# Patient Record
Sex: FEMALE | Race: WHITE | NOT HISPANIC OR LATINO | Employment: UNEMPLOYED | URBAN - METROPOLITAN AREA
[De-identification: names, ages, dates, MRNs, and addresses within clinical notes are randomized per-mention and may not be internally consistent; named-entity substitution may affect disease eponyms.]

---

## 2017-11-08 ENCOUNTER — TELEPHONE (OUTPATIENT)
Dept: NEUROSURGERY | Facility: CLINIC | Age: 57
End: 2017-11-08

## 2017-11-08 DIAGNOSIS — I65.29 STENOSIS OF CAROTID ARTERY, UNSPECIFIED LATERALITY: Primary | ICD-10-CM

## 2017-11-09 NOTE — TELEPHONE ENCOUNTER
I spoke with Paige this morning.  She says that pt will need to have repeated u/s wherever she had it done in the past.  This will assure we have the same quality study to compare to.

## 2017-11-09 NOTE — TELEPHONE ENCOUNTER
I usually schedule my ultrasounds at the hospital, so does anybody know if Dr. riggins does his different and has it at Sushil's office? DC

## 2017-12-13 ENCOUNTER — OFFICE VISIT (OUTPATIENT)
Dept: NEUROSURGERY | Facility: CLINIC | Age: 57
End: 2017-12-13

## 2017-12-13 ENCOUNTER — HOSPITAL ENCOUNTER (OUTPATIENT)
Dept: CARDIOLOGY | Facility: HOSPITAL | Age: 57
Discharge: HOME | End: 2017-12-13
Admitting: PHYSICIAN ASSISTANT

## 2017-12-13 VITALS
DIASTOLIC BLOOD PRESSURE: 92 MMHG | BODY MASS INDEX: 31.98 KG/M2 | SYSTOLIC BLOOD PRESSURE: 152 MMHG | HEIGHT: 62 IN | TEMPERATURE: 99.8 F | WEIGHT: 173.8 LBS

## 2017-12-13 DIAGNOSIS — I65.29 STENOSIS OF CAROTID ARTERY, UNSPECIFIED LATERALITY: ICD-10-CM

## 2017-12-13 DIAGNOSIS — I65.23 BILATERAL CAROTID ARTERY STENOSIS: Primary | ICD-10-CM

## 2017-12-13 LAB
BH CV XLRA MEAS LEFT CCA RATIO VEL: 86.8 CM/SEC
BH CV XLRA MEAS LEFT DIST CCA EDV: 29.5 CM/SEC
BH CV XLRA MEAS LEFT DIST CCA PSV: 95.2 CM/SEC
BH CV XLRA MEAS LEFT DIST ICA EDV: 28.4 CM/SEC
BH CV XLRA MEAS LEFT DIST ICA PSV: 66.4 CM/SEC
BH CV XLRA MEAS LEFT ICA RATIO VEL: 148 CM/SEC
BH CV XLRA MEAS LEFT ICA/CCA RATIO: 1.7
BH CV XLRA MEAS LEFT MID CCA EDV: 26.1 CM/SEC
BH CV XLRA MEAS LEFT MID CCA PSV: 86.8 CM/SEC
BH CV XLRA MEAS LEFT MID ICA EDV: 32.9 CM/SEC
BH CV XLRA MEAS LEFT MID ICA PSV: 125 CM/SEC
BH CV XLRA MEAS LEFT PROX CCA EDV: 36 CM/SEC
BH CV XLRA MEAS LEFT PROX CCA PSV: 119 CM/SEC
BH CV XLRA MEAS LEFT PROX ECA PSV: 107 CM/SEC
BH CV XLRA MEAS LEFT PROX ICA EDV: 48 CM/SEC
BH CV XLRA MEAS LEFT PROX ICA PSV: 136 CM/SEC
BH CV XLRA MEAS LEFT PROX SCLA PSV: 144 CM/SEC
BH CV XLRA MEAS LEFT VERTEBRAL A PSV: 68.8 CM/SEC
BH CV XLRA MEAS RIGHT CCA RATIO VEL: 95.5 CM/SEC
BH CV XLRA MEAS RIGHT DIST CCA EDV: 25.8 CM/SEC
BH CV XLRA MEAS RIGHT DIST CCA PSV: 89.3 CM/SEC
BH CV XLRA MEAS RIGHT DIST ICA EDV: 27.1 CM/SEC
BH CV XLRA MEAS RIGHT DIST ICA PSV: 61.6 CM/SEC
BH CV XLRA MEAS RIGHT ICA RATIO VEL: 152 CM/SEC
BH CV XLRA MEAS RIGHT ICA/CCA RATIO: 1.6
BH CV XLRA MEAS RIGHT MID CCA EDV: 25.8 CM/SEC
BH CV XLRA MEAS RIGHT MID CCA PSV: 95.5 CM/SEC
BH CV XLRA MEAS RIGHT MID ICA EDV: 57.4 CM/SEC
BH CV XLRA MEAS RIGHT MID ICA PSV: 152 CM/SEC
BH CV XLRA MEAS RIGHT PROX CCA EDV: 24.5 CM/SEC
BH CV XLRA MEAS RIGHT PROX CCA PSV: 113 CM/SEC
BH CV XLRA MEAS RIGHT PROX ECA PSV: 91.7 CM/SEC
BH CV XLRA MEAS RIGHT PROX ICA EDV: 42.1 CM/SEC
BH CV XLRA MEAS RIGHT PROX ICA PSV: 110 CM/SEC
BH CV XLRA MEAS RIGHT PROX SCLA PSV: 204 CM/SEC
BH CV XLRA MEAS RIGHT VERTEBRAL A PSV: 35.4 CM/SEC

## 2017-12-13 PROCEDURE — 99214 OFFICE O/P EST MOD 30 MIN: CPT | Performed by: NURSE PRACTITIONER

## 2017-12-13 PROCEDURE — 93880 EXTRACRANIAL BILAT STUDY: CPT | Performed by: INTERNAL MEDICINE

## 2017-12-13 PROCEDURE — 93880 EXTRACRANIAL BILAT STUDY: CPT

## 2017-12-13 RX ORDER — ATORVASTATIN CALCIUM 40 MG/1
40 TABLET, FILM COATED ORAL EVERY MORNING
COMMUNITY
Start: 2017-10-30 | End: 2022-08-26 | Stop reason: HOSPADM

## 2017-12-13 RX ORDER — NICOTINE POLACRILEX 4 MG/1
GUM, CHEWING ORAL
COMMUNITY
Start: 2017-11-30 | End: 2022-08-23

## 2017-12-13 RX ORDER — CANAGLIFLOZIN 100 MG/1
TABLET, FILM COATED ORAL
COMMUNITY
Start: 2017-11-30 | End: 2022-08-23

## 2017-12-13 RX ORDER — OXYBUTYNIN CHLORIDE 5 MG/1
5 TABLET ORAL 2 TIMES DAILY
COMMUNITY
Start: 2017-10-30

## 2017-12-13 RX ORDER — FLUOXETINE HYDROCHLORIDE 40 MG/1
40 CAPSULE ORAL DAILY
COMMUNITY
Start: 2017-11-30

## 2017-12-13 RX ORDER — AMLODIPINE BESYLATE 10 MG/1
10 TABLET ORAL DAILY
COMMUNITY
Start: 2017-10-30

## 2017-12-13 NOTE — PROGRESS NOTES
"NAME: GRISELDA MCGUIRE   DOS: 2017  : 1960  PCP: JONA Mendoza    Chief Complaint:    Chief Complaint   Patient presents with   • Carotid Artery Disease     f/u yearly       History of Present Illness:  57 y.o. female well known to the neurointerventional service who was last seen in 2015 after suffering from a lacunar stroke which occurred in 2014.  She has been followed for a atherosclerotic plaque formation at there right carotid bifurcation, however this was \"non-flow limiting\" in nature and without hemodynamically significant stenosis and she was treated with medical therapy.  She presents today for follow-up.  She has no new evidence of stroke such as unilateral weakness or numbness, loss of speech fluency or vision changes.      Past Medical History:  Past Medical History:   Diagnosis Date   • Diabetes mellitus    • Hypertension    • Stroke        Past Surgical History:  History reviewed. No pertinent surgical history.    Review of Systems:        Review of Systems   Constitutional: Negative for activity change, appetite change, chills, diaphoresis, fatigue, fever and unexpected weight change.   HENT: Negative for congestion, dental problem, drooling, ear discharge, ear pain, facial swelling, hearing loss, mouth sores, nosebleeds, postnasal drip, rhinorrhea, sinus pressure, sneezing, sore throat, tinnitus, trouble swallowing and voice change.    Eyes: Negative for photophobia, pain, discharge, redness, itching and visual disturbance.   Respiratory: Negative for apnea, cough, choking, chest tightness, shortness of breath, wheezing and stridor.    Cardiovascular: Negative for chest pain, palpitations and leg swelling.   Gastrointestinal: Negative for abdominal distention, abdominal pain, anal bleeding, blood in stool, constipation, diarrhea, nausea, rectal pain and vomiting.   Endocrine: Negative for cold intolerance, heat intolerance, polydipsia, polyphagia and " polyuria.   Genitourinary: Negative for decreased urine volume, difficulty urinating, dysuria, enuresis, flank pain, frequency, genital sores, hematuria and urgency.   Musculoskeletal: Negative for arthralgias, back pain, gait problem, joint swelling, myalgias, neck pain and neck stiffness.   Skin: Negative for color change, pallor, rash and wound.   Allergic/Immunologic: Negative for environmental allergies, food allergies and immunocompromised state.   Neurological: Negative for dizziness, tremors, seizures, syncope, facial asymmetry, speech difficulty, weakness, light-headedness, numbness and headaches.   Hematological: Negative for adenopathy. Does not bruise/bleed easily.   Psychiatric/Behavioral: Negative for agitation, behavioral problems, confusion, decreased concentration, dysphoric mood, hallucinations, self-injury, sleep disturbance and suicidal ideas. The patient is not nervous/anxious and is not hyperactive.         Medications    Current Outpatient Prescriptions:   •  amLODIPine (NORVASC) 10 MG tablet, , Disp: , Rfl:   •  atorvastatin (LIPITOR) 40 MG tablet, , Disp: , Rfl:   •  FLUoxetine (PROzac) 40 MG capsule, , Disp: , Rfl:   •  INVOKANA 100 MG tablet, , Disp: , Rfl:   •  metoprolol tartrate (LOPRESSOR) 25 MG tablet, , Disp: , Rfl:   •  Omeprazole 20 MG tablet delayed-release, , Disp: , Rfl:   •  oxybutynin (DITROPAN) 5 MG tablet, , Disp: , Rfl:     Allergies:  No Known Allergies    Social Hx:  Social History   Substance Use Topics   • Smoking status: Never Smoker   • Smokeless tobacco: Never Used   • Alcohol use Yes      Comment: rarely       Family Hx:  Family History   Problem Relation Age of Onset   • No Known Problems Mother    • Heart attack Father        Review of Imaging:  Bilateral Carotid Duplex:     Prox CCA .0 cm/sec     Prox CCA .0 cm/sec     Prox CCA EDV 36.0 cm/sec     Prox CCA EDV 24.5 cm/sec     left Mid CCA PSV 86.8 cm/sec     Right Mid CCA PSV 95.5 cm/sec     left  Mid CCA EDV 26.1 cm/sec     right Mid CCA EDV 25.8 cm/sec     Dist CCA PSV 95.2 cm/sec     Dist CCA PSV 89.3 cm/sec     Dist CCA EDV 29.5 cm/sec     Dist CCA EDV 25.8 cm/sec     CCA ratio mirtha 86.8 cm/sec     CCA ratio mirtha 95.5 cm/sec     Prox ECA .0 cm/sec     Prox ECA PSV 91.7 cm/sec     Prox ICA .0 cm/sec     Prox ICA .0 cm/sec     Prox ICA EDV 48.0 cm/sec     Prox ICA EDV 42.1 cm/sec     Mid ICA .0 cm/sec     Mid ICA .0 cm/sec     Mid ICA EDV 32.9 cm/sec     Mid ICA EDV 57.4 cm/sec     Dist ICA PSV 66.4 cm/sec     Dist ICA PSV 61.6 cm/sec     Dist ICA EDV 28.4 cm/sec     Dist ICA EDV 27.1 cm/sec     ICA ratio mirtha 148.0 cm/sec     ICA ratio mirtha 152.0 cm/sec     Vertebral A PSV 68.8 cm/sec     Vertebral A PSV 35.4 cm/sec     ICA/CCA ratio 1.7    ICA/CCA ratio 1.6    Prox SCLA .0 cm/sec     Prox SCLA .0 cm/sec    Narrative:       · Right internal carotid artery stenosis of 50-69%.  · Left internal carotid artery stenosis of 50-69%.        I have personally reviewed this.    Physical Examination:  Vitals:    12/13/17 1051   BP: 152/92   Temp: 99.8 °F (37.7 °C)        General Appearance:   Well developed, well nourished, well groomed, alert, and cooperative.  Cardiovascular: Regular rate and rhythm. No carotid bruits      Neurological examination:  Neurologic Exam     Mental Status   Oriented to person, place, and time.   Speech: speech is normal   Level of consciousness: alert  Knowledge: good.     Cranial Nerves   Cranial nerves II through XII intact.     Motor Exam   Muscle bulk: normal    Sensory Exam   Light touch normal.     Gait, Coordination, and Reflexes     Gait  Gait: normal    Coordination   Romberg: negative  Finger to nose coordination: normal      Diagnoses/Plan:    Ms. Garnica is a 57 y.o. female who suffered a lacunar stroke in September of 2014.  She was found to have a non-flow limiting stenosis of her right carotid bifurcation and this has been  medically managed.  She has done well since then with no new signs and symptoms of a stroke.  Her carotid duplex shows stable plaque on the right but slight increase on the left.  We have discussed increasing her ASA to 325mg and continuing her atorvastatin.  We discussed having her HgA1C drawn by her PCP and continuing to monitor for optimal management.  We discussed what signs and symptoms would warrant her returning to the ED for stroke such as slurred speech, facial droop and appendage weakness and she verbalizes understanding.  She will see us in 1 year for follow up with Carotid Duplex for continued monitoring to exclude any progression of disease that may warrant intervention.

## 2019-08-21 ENCOUNTER — TELEPHONE (OUTPATIENT)
Dept: NEUROSURGERY | Facility: CLINIC | Age: 59
End: 2019-08-21

## 2019-08-21 DIAGNOSIS — I65.23 BILATERAL CAROTID ARTERY STENOSIS: Primary | ICD-10-CM

## 2019-08-21 NOTE — TELEPHONE ENCOUNTER
Kimmy Perkins to [facility] Clinical Pool   17 minutes ago  Can someone place a carotid duplex for this patients follow up? Thanks

## 2019-09-18 ENCOUNTER — HOSPITAL ENCOUNTER (OUTPATIENT)
Dept: CARDIOLOGY | Facility: HOSPITAL | Age: 59
Discharge: HOME | End: 2019-09-18
Admitting: PHYSICIAN ASSISTANT

## 2019-09-18 ENCOUNTER — OFFICE VISIT (OUTPATIENT)
Dept: NEUROSURGERY | Facility: CLINIC | Age: 59
End: 2019-09-18

## 2019-09-18 VITALS
DIASTOLIC BLOOD PRESSURE: 64 MMHG | WEIGHT: 177 LBS | SYSTOLIC BLOOD PRESSURE: 142 MMHG | HEIGHT: 62 IN | BODY MASS INDEX: 32.57 KG/M2 | TEMPERATURE: 98.2 F

## 2019-09-18 DIAGNOSIS — I65.22 CAROTID STENOSIS, ASYMPTOMATIC, LEFT: Primary | ICD-10-CM

## 2019-09-18 DIAGNOSIS — I65.23 BILATERAL CAROTID ARTERY STENOSIS: ICD-10-CM

## 2019-09-18 LAB
BH CV XLRA MEAS LEFT BULB EDV: 25.1 CM/SEC
BH CV XLRA MEAS LEFT BULB PSV: 102 CM/SEC
BH CV XLRA MEAS LEFT CCA RATIO VEL: 99 CM/SEC
BH CV XLRA MEAS LEFT DIST CCA EDV: 23.6 CM/SEC
BH CV XLRA MEAS LEFT DIST CCA PSV: 85.6 CM/SEC
BH CV XLRA MEAS LEFT DIST ICA EDV: 24 CM/SEC
BH CV XLRA MEAS LEFT DIST ICA PSV: 63 CM/SEC
BH CV XLRA MEAS LEFT ICA RATIO VEL: 117 CM/SEC
BH CV XLRA MEAS LEFT ICA/CCA RATIO: 1.2
BH CV XLRA MEAS LEFT MID CCA EDV: 21.2 CM/SEC
BH CV XLRA MEAS LEFT MID CCA PSV: 99.8 CM/SEC
BH CV XLRA MEAS LEFT MID ICA EDV: 41 CM/SEC
BH CV XLRA MEAS LEFT MID ICA PSV: 138 CM/SEC
BH CV XLRA MEAS LEFT PROX CCA EDV: 22 CM/SEC
BH CV XLRA MEAS LEFT PROX CCA PSV: 113.1 CM/SEC
BH CV XLRA MEAS LEFT PROX ECA EDV: 13 CM/SEC
BH CV XLRA MEAS LEFT PROX ECA PSV: 138 CM/SEC
BH CV XLRA MEAS LEFT PROX ICA EDV: 41 CM/SEC
BH CV XLRA MEAS LEFT PROX ICA PSV: 181 CM/SEC
BH CV XLRA MEAS LEFT PROX SCLA PSV: 168 CM/SEC
BH CV XLRA MEAS LEFT VERTEBRAL A EDV: 14 CM/SEC
BH CV XLRA MEAS LEFT VERTEBRAL A PSV: 59 CM/SEC
BH CV XLRA MEAS RIGHT CCA RATIO VEL: 102 CM/SEC
BH CV XLRA MEAS RIGHT DIST CCA EDV: 22 CM/SEC
BH CV XLRA MEAS RIGHT DIST CCA PSV: 80 CM/SEC
BH CV XLRA MEAS RIGHT DIST ICA EDV: 20 CM/SEC
BH CV XLRA MEAS RIGHT DIST ICA PSV: 77 CM/SEC
BH CV XLRA MEAS RIGHT ICA RATIO VEL: 128 CM/SEC
BH CV XLRA MEAS RIGHT ICA/CCA RATIO: 1.3
BH CV XLRA MEAS RIGHT MID CCA EDV: 20 CM/SEC
BH CV XLRA MEAS RIGHT MID CCA PSV: 103 CM/SEC
BH CV XLRA MEAS RIGHT MID ICA EDV: 40 CM/SEC
BH CV XLRA MEAS RIGHT MID ICA PSV: 116 CM/SEC
BH CV XLRA MEAS RIGHT PROX CCA EDV: 15 CM/SEC
BH CV XLRA MEAS RIGHT PROX CCA PSV: 96 CM/SEC
BH CV XLRA MEAS RIGHT PROX ECA EDV: 16 CM/SEC
BH CV XLRA MEAS RIGHT PROX ECA PSV: 113 CM/SEC
BH CV XLRA MEAS RIGHT PROX ICA EDV: 35 CM/SEC
BH CV XLRA MEAS RIGHT PROX ICA PSV: 128 CM/SEC
BH CV XLRA MEAS RIGHT PROX SCLA PSV: 121 CM/SEC
BH CV XLRA MEAS RIGHT VERTEBRAL A EDV: 18 CM/SEC
BH CV XLRA MEAS RIGHT VERTEBRAL A PSV: 62 CM/SEC
LEFT ARM BP: NORMAL MMHG
RIGHT ARM BP: NORMAL MMHG

## 2019-09-18 PROCEDURE — 93880 EXTRACRANIAL BILAT STUDY: CPT | Performed by: INTERNAL MEDICINE

## 2019-09-18 PROCEDURE — 93880 EXTRACRANIAL BILAT STUDY: CPT

## 2019-09-18 PROCEDURE — 99213 OFFICE O/P EST LOW 20 MIN: CPT | Performed by: RADIOLOGY

## 2019-09-18 RX ORDER — ASPIRIN 325 MG
325 TABLET, DELAYED RELEASE (ENTERIC COATED) ORAL NIGHTLY
COMMUNITY
End: 2022-08-26 | Stop reason: HOSPADM

## 2019-09-18 RX ORDER — LISINOPRIL 10 MG/1
10 TABLET ORAL DAILY
Status: ON HOLD | COMMUNITY
End: 2022-08-24

## 2019-09-18 NOTE — PROGRESS NOTES
"NAME: GRISELDA MCGUIRE   DOS: 2019  : 1960  PCP: Andie Chambers APRN    Chief Complaint:    Chief Complaint   Patient presents with   • Carotid Artery Disease       History of Present Illness:  59 y.o. female known to the interventional service, being followed for asymptomatic carotid disease.  She was initially seen in 2015 after suffering a \"lacunar\" stroke, likely related to poorly controlled diabetes.  She was found to have plaque formation at the bilateral carotid bifurcations, but this was \"non-flow-limiting\" in nature and of no hemodynamic significance, and thus is being managed conservatively.  She has done very well since I saw her last, denying any stroke or TIA-like symptoms.  She presents today for routine follow-up.      Past Medical History:  Past Medical History:   Diagnosis Date   • Diabetes mellitus (CMS/HCC)    • Hypertension    • Stroke (CMS/HCC)        Past Surgical History:  No past surgical history on file.    Review of Systems:        Review of Systems   Eyes: Positive for visual disturbance.   All other systems reviewed and are negative.       Medications    Current Outpatient Medications:   •  aspirin  MG tablet, Take 325 mg by mouth Every Night., Disp: , Rfl:   •  linagliptin (TRADJENTA) 5 MG tablet tablet, Take 5 mg by mouth Daily., Disp: , Rfl:   •  lisinopril (PRINIVIL,ZESTRIL) 10 MG tablet, Take 10 mg by mouth Daily., Disp: , Rfl:   •  amLODIPine (NORVASC) 10 MG tablet, , Disp: , Rfl:   •  atorvastatin (LIPITOR) 40 MG tablet, , Disp: , Rfl:   •  FLUoxetine (PROzac) 40 MG capsule, , Disp: , Rfl:   •  INVOKANA 100 MG tablet, , Disp: , Rfl:   •  metoprolol tartrate (LOPRESSOR) 25 MG tablet, , Disp: , Rfl:   •  Omeprazole 20 MG tablet delayed-release, , Disp: , Rfl:   •  oxybutynin (DITROPAN) 5 MG tablet, , Disp: , Rfl:     Allergies:  No Known Allergies    Social Hx:  Social History     Tobacco Use   • Smoking status: Never Smoker   • Smokeless tobacco: " "Never Used   Substance Use Topics   • Alcohol use: Yes     Comment: rarely   • Drug use: No       Family Hx:  Family History   Problem Relation Age of Onset   • No Known Problems Mother    • Heart attack Father        Review of Imaging:  Carotid duplex from [facility] dated 9/18/2019 was reviewed along with its corresponding radiologic report.  Comparison is made to prior study dated 12/13/2017.  Given differences in technique, there is stable mild-moderate plaque formation at the bilateral carotid bifurcations, but without hemodynamically significant disease.  Peak velocities within the right carotid vasculature are 128/35 cm/s, with and ICA/CCA ratio of 1.3 (was 152/57 cm/s, ratio 1.6).  Peak velocities within the left carotid vasculature are 164/38 cm/s, ratio 1.2 (was 136/48 cm/s, ratio 1.7).    Physical Examination:  Vitals:    09/18/19 1512   BP: 142/64   Temp: 98.2 °F (36.8 °C)        General Appearance:   Well developed, well nourished, well groomed, alert, and cooperative.  Cardiovascular: Regular rate and rhythm. No carotid bruits      Neurological examination:  Neurologic Exam     Mental Status   Oriented to person, place, and time.   Speech: speech is normal   Level of consciousness: alert    Cranial Nerves     CN II   Visual acuity: decreased (Decreased visual acuity in the left eye, currently receiving \"injections\".)    Motor Exam     Strength   Strength 5/5 throughout.     Sensory Exam   Light touch normal.     Gait, Coordination, and Reflexes     Gait  Gait: normal      Diagnoses/Plan:    Ms. Garnica is a 59 y.o. female known to the interventional service, being followed for asymptomatic carotid artery disease found during a \"lacunar\" stroke in September 2014 (likely related to poorly controlled diabetes).  Carotid duplex today demonstrates stable plaque formation at the carotid bifurcations, without hemodynamically significant lesion/stenosis.  Given the asymptomatic nature, will " continue conservative management with aspirin/statin therapy.  I plan on seeing her back in 12 months time with carotid duplex, to ensure stability and exclude any progression of disease that might necessitate further treatment/intervention.  She will contact our office and/or call 911 during the interim if she develops any stroke or TIA-like symptoms.

## 2022-08-23 ENCOUNTER — APPOINTMENT (OUTPATIENT)
Dept: GENERAL RADIOLOGY | Facility: HOSPITAL | Age: 62
End: 2022-08-23

## 2022-08-23 ENCOUNTER — APPOINTMENT (OUTPATIENT)
Dept: CT IMAGING | Facility: HOSPITAL | Age: 62
End: 2022-08-23

## 2022-08-23 ENCOUNTER — APPOINTMENT (OUTPATIENT)
Dept: MRI IMAGING | Facility: HOSPITAL | Age: 62
End: 2022-08-23

## 2022-08-23 ENCOUNTER — HOSPITAL ENCOUNTER (OUTPATIENT)
Facility: HOSPITAL | Age: 62
Discharge: HOME | End: 2022-08-26
Attending: EMERGENCY MEDICINE | Admitting: RADIOLOGY

## 2022-08-23 DIAGNOSIS — R47.9 SPEECH DISTURBANCE, UNSPECIFIED TYPE: ICD-10-CM

## 2022-08-23 DIAGNOSIS — R42 DIZZINESS: Primary | ICD-10-CM

## 2022-08-23 DIAGNOSIS — U07.1 COVID-19: ICD-10-CM

## 2022-08-23 LAB
ALBUMIN SERPL-MCNC: 4.4 G/DL (ref 3.5–5.2)
ALBUMIN/GLOB SERPL: 1.3 G/DL
ALP SERPL-CCNC: 83 U/L (ref 39–117)
ALT SERPL W P-5'-P-CCNC: 17 U/L (ref 1–33)
ALT SERPL W P-5'-P-CCNC: 18 U/L (ref 1–33)
AMPHET+METHAMPHET UR QL: NEGATIVE
AMPHETAMINES UR QL: NEGATIVE
ANION GAP SERPL CALCULATED.3IONS-SCNC: 17 MMOL/L (ref 5–15)
APAP SERPL-MCNC: <5 MCG/ML (ref 0–30)
APTT PPP: 27.4 SECONDS (ref 22–39)
AST SERPL-CCNC: 25 U/L (ref 1–32)
AST SERPL-CCNC: 26 U/L (ref 1–32)
BARBITURATES UR QL SCN: NEGATIVE
BASOPHILS # BLD AUTO: 0.08 10*3/MM3 (ref 0–0.2)
BASOPHILS NFR BLD AUTO: 0.7 % (ref 0–1.5)
BENZODIAZ UR QL SCN: NEGATIVE
BILIRUB SERPL-MCNC: 0.4 MG/DL (ref 0–1.2)
BILIRUB UR QL STRIP: NEGATIVE
BUN BLDA-MCNC: 27 MG/DL (ref 8–26)
BUN SERPL-MCNC: 24 MG/DL (ref 8–23)
BUN/CREAT SERPL: 17.6 (ref 7–25)
BUPRENORPHINE SERPL-MCNC: NEGATIVE NG/ML
CA-I BLDA-SCNC: 1.25 MMOL/L (ref 1.2–1.32)
CALCIUM SPEC-SCNC: 9.5 MG/DL (ref 8.6–10.5)
CANNABINOIDS SERPL QL: NEGATIVE
CHLORIDE BLDA-SCNC: 101 MMOL/L (ref 98–109)
CHLORIDE SERPL-SCNC: 99 MMOL/L (ref 98–107)
CLARITY UR: CLEAR
CO2 BLDA-SCNC: 26 MMOL/L (ref 24–29)
CO2 SERPL-SCNC: 22 MMOL/L (ref 22–29)
COCAINE UR QL: NEGATIVE
COLOR UR: YELLOW
CREAT BLDA-MCNC: 1.4 MG/DL (ref 0.6–1.3)
CREAT SERPL-MCNC: 1.36 MG/DL (ref 0.57–1)
DEPRECATED RDW RBC AUTO: 37.5 FL (ref 37–54)
EGFRCR SERPLBLD CKD-EPI 2021: 42.1 ML/MIN/1.73
EGFRCR SERPLBLD CKD-EPI 2021: 43.6 ML/MIN/1.73
EOSINOPHIL # BLD AUTO: 1.54 10*3/MM3 (ref 0–0.4)
EOSINOPHIL NFR BLD AUTO: 14.2 % (ref 0.3–6.2)
ERYTHROCYTE [DISTWIDTH] IN BLOOD BY AUTOMATED COUNT: 12.2 % (ref 12.3–15.4)
ETHANOL BLD-MCNC: <10 MG/DL (ref 0–10)
FLUAV RNA RESP QL NAA+PROBE: NOT DETECTED
FLUBV RNA RESP QL NAA+PROBE: NOT DETECTED
GLOBULIN UR ELPH-MCNC: 3.5 GM/DL
GLUCOSE BLDC GLUCOMTR-MCNC: 98 MG/DL (ref 70–130)
GLUCOSE SERPL-MCNC: 105 MG/DL (ref 65–99)
GLUCOSE UR STRIP-MCNC: NEGATIVE MG/DL
HCT VFR BLD AUTO: 38.7 % (ref 34–46.6)
HCT VFR BLDA CALC: 40 % (ref 38–51)
HGB BLD-MCNC: 13 G/DL (ref 12–15.9)
HGB BLDA-MCNC: 13.6 G/DL (ref 12–17)
HGB UR QL STRIP.AUTO: NEGATIVE
HOLD SPECIMEN: NORMAL
IMM GRANULOCYTES # BLD AUTO: 0.03 10*3/MM3 (ref 0–0.05)
IMM GRANULOCYTES NFR BLD AUTO: 0.3 % (ref 0–0.5)
INR PPP: 1 (ref 0.8–1.2)
KETONES UR QL STRIP: NEGATIVE
LEUKOCYTE ESTERASE UR QL STRIP.AUTO: NEGATIVE
LYMPHOCYTES # BLD AUTO: 4.56 10*3/MM3 (ref 0.7–3.1)
LYMPHOCYTES NFR BLD AUTO: 42.1 % (ref 19.6–45.3)
MCH RBC QN AUTO: 28.6 PG (ref 26.6–33)
MCHC RBC AUTO-ENTMCNC: 33.6 G/DL (ref 31.5–35.7)
MCV RBC AUTO: 85.2 FL (ref 79–97)
METHADONE UR QL SCN: NEGATIVE
MONOCYTES # BLD AUTO: 0.67 10*3/MM3 (ref 0.1–0.9)
MONOCYTES NFR BLD AUTO: 6.2 % (ref 5–12)
NEUTROPHILS NFR BLD AUTO: 3.94 10*3/MM3 (ref 1.7–7)
NEUTROPHILS NFR BLD AUTO: 36.5 % (ref 42.7–76)
NITRITE UR QL STRIP: NEGATIVE
NRBC BLD AUTO-RTO: 0 /100 WBC (ref 0–0.2)
OPIATES UR QL: NEGATIVE
OXYCODONE UR QL SCN: NEGATIVE
PCP UR QL SCN: NEGATIVE
PH UR STRIP.AUTO: 6.5 [PH] (ref 5–8)
PLATELET # BLD AUTO: 273 10*3/MM3 (ref 140–450)
PMV BLD AUTO: 11.3 FL (ref 6–12)
POTASSIUM BLDA-SCNC: 3.6 MMOL/L (ref 3.5–4.9)
POTASSIUM SERPL-SCNC: 3.8 MMOL/L (ref 3.5–5.2)
PROPOXYPH UR QL: NEGATIVE
PROT SERPL-MCNC: 7.9 G/DL (ref 6–8.5)
PROT UR QL STRIP: NEGATIVE
PROTHROMBIN TIME: 11.7 SECONDS (ref 12.8–15.2)
RBC # BLD AUTO: 4.54 10*6/MM3 (ref 3.77–5.28)
SALICYLATES SERPL-MCNC: <0.3 MG/DL
SARS-COV-2 RNA RESP QL NAA+PROBE: DETECTED
SODIUM BLD-SCNC: 142 MMOL/L (ref 138–146)
SODIUM SERPL-SCNC: 138 MMOL/L (ref 136–145)
SP GR UR STRIP: 1.06 (ref 1–1.03)
TRICYCLICS UR QL SCN: NEGATIVE
TROPONIN T SERPL-MCNC: <0.01 NG/ML (ref 0–0.03)
UROBILINOGEN UR QL STRIP: ABNORMAL
WBC NRBC COR # BLD: 10.82 10*3/MM3 (ref 3.4–10.8)
WHOLE BLOOD HOLD COAG: NORMAL
WHOLE BLOOD HOLD SPECIMEN: NORMAL

## 2022-08-23 PROCEDURE — 80053 COMPREHEN METABOLIC PANEL: CPT | Performed by: EMERGENCY MEDICINE

## 2022-08-23 PROCEDURE — 85014 HEMATOCRIT: CPT

## 2022-08-23 PROCEDURE — 99285 EMERGENCY DEPT VISIT HI MDM: CPT

## 2022-08-23 PROCEDURE — 71045 X-RAY EXAM CHEST 1 VIEW: CPT

## 2022-08-23 PROCEDURE — C9803 HOPD COVID-19 SPEC COLLECT: HCPCS

## 2022-08-23 PROCEDURE — 80179 DRUG ASSAY SALICYLATE: CPT | Performed by: EMERGENCY MEDICINE

## 2022-08-23 PROCEDURE — 84484 ASSAY OF TROPONIN QUANT: CPT | Performed by: EMERGENCY MEDICINE

## 2022-08-23 PROCEDURE — 70498 CT ANGIOGRAPHY NECK: CPT

## 2022-08-23 PROCEDURE — 0042T HC CT CEREBRAL PERFUSION W/WO CONTRAST: CPT

## 2022-08-23 PROCEDURE — 70450 CT HEAD/BRAIN W/O DYE: CPT

## 2022-08-23 PROCEDURE — 85025 COMPLETE CBC W/AUTO DIFF WBC: CPT | Performed by: EMERGENCY MEDICINE

## 2022-08-23 PROCEDURE — 80047 BASIC METABLC PNL IONIZED CA: CPT

## 2022-08-23 PROCEDURE — 81003 URINALYSIS AUTO W/O SCOPE: CPT | Performed by: EMERGENCY MEDICINE

## 2022-08-23 PROCEDURE — 87636 SARSCOV2 & INF A&B AMP PRB: CPT | Performed by: FAMILY MEDICINE

## 2022-08-23 PROCEDURE — 99204 OFFICE O/P NEW MOD 45 MIN: CPT | Performed by: STUDENT IN AN ORGANIZED HEALTH CARE EDUCATION/TRAINING PROGRAM

## 2022-08-23 PROCEDURE — 85610 PROTHROMBIN TIME: CPT

## 2022-08-23 PROCEDURE — 80306 DRUG TEST PRSMV INSTRMNT: CPT | Performed by: EMERGENCY MEDICINE

## 2022-08-23 PROCEDURE — 70551 MRI BRAIN STEM W/O DYE: CPT

## 2022-08-23 PROCEDURE — 85379 FIBRIN DEGRADATION QUANT: CPT | Performed by: NURSE PRACTITIONER

## 2022-08-23 PROCEDURE — 93005 ELECTROCARDIOGRAM TRACING: CPT | Performed by: EMERGENCY MEDICINE

## 2022-08-23 PROCEDURE — 82077 ASSAY SPEC XCP UR&BREATH IA: CPT | Performed by: EMERGENCY MEDICINE

## 2022-08-23 PROCEDURE — 0 IOPAMIDOL PER 1 ML: Performed by: EMERGENCY MEDICINE

## 2022-08-23 PROCEDURE — 70496 CT ANGIOGRAPHY HEAD: CPT

## 2022-08-23 PROCEDURE — 80143 DRUG ASSAY ACETAMINOPHEN: CPT | Performed by: EMERGENCY MEDICINE

## 2022-08-23 PROCEDURE — 85730 THROMBOPLASTIN TIME PARTIAL: CPT | Performed by: EMERGENCY MEDICINE

## 2022-08-23 RX ORDER — LOSARTAN POTASSIUM 100 MG/1
1 TABLET ORAL DAILY
COMMUNITY
Start: 2022-03-30

## 2022-08-23 RX ORDER — GLIPIZIDE 5 MG/1
0.5 TABLET ORAL DAILY
COMMUNITY
Start: 2022-04-05

## 2022-08-23 RX ORDER — MECLIZINE HYDROCHLORIDE 25 MG/1
25 TABLET ORAL 3 TIMES DAILY PRN
Status: DISCONTINUED | OUTPATIENT
Start: 2022-08-23 | End: 2022-08-26 | Stop reason: HOSPADM

## 2022-08-23 RX ORDER — ATORVASTATIN CALCIUM 40 MG/1
40 TABLET, FILM COATED ORAL NIGHTLY
Status: CANCELLED | OUTPATIENT
Start: 2022-08-23

## 2022-08-23 RX ORDER — OMEPRAZOLE 20 MG/1
1 CAPSULE, DELAYED RELEASE ORAL DAILY
COMMUNITY
Start: 2022-03-30 | End: 2022-09-14 | Stop reason: HOSPADM

## 2022-08-23 RX ORDER — SODIUM CHLORIDE 0.9 % (FLUSH) 0.9 %
10 SYRINGE (ML) INJECTION AS NEEDED
Status: DISCONTINUED | OUTPATIENT
Start: 2022-08-23 | End: 2022-08-26 | Stop reason: HOSPADM

## 2022-08-23 RX ADMIN — IOPAMIDOL 125 ML: 755 INJECTION, SOLUTION INTRAVENOUS at 19:16

## 2022-08-24 ENCOUNTER — APPOINTMENT (OUTPATIENT)
Dept: CARDIOLOGY | Facility: HOSPITAL | Age: 62
End: 2022-08-24

## 2022-08-24 PROBLEM — F41.8 ANXIETY ASSOCIATED WITH DEPRESSION: Status: ACTIVE | Noted: 2022-08-24

## 2022-08-24 PROBLEM — K21.9 GERD WITHOUT ESOPHAGITIS: Status: ACTIVE | Noted: 2022-08-24

## 2022-08-24 PROBLEM — E11.9 T2DM (TYPE 2 DIABETES MELLITUS) (HCC): Status: ACTIVE | Noted: 2022-08-24

## 2022-08-24 PROBLEM — R47.81 SLURRED SPEECH: Status: ACTIVE | Noted: 2022-08-24

## 2022-08-24 PROBLEM — I10 HTN (HYPERTENSION): Status: ACTIVE | Noted: 2022-08-24

## 2022-08-24 PROBLEM — N17.9 ACUTE KIDNEY INJURY (HCC): Status: ACTIVE | Noted: 2022-08-24

## 2022-08-24 PROBLEM — R42 DIZZINESS: Status: ACTIVE | Noted: 2022-08-24

## 2022-08-24 LAB
ANION GAP SERPL CALCULATED.3IONS-SCNC: 11 MMOL/L (ref 5–15)
ASCENDING AORTA: 2.5 CM
BASOPHILS # BLD AUTO: 0.05 10*3/MM3 (ref 0–0.2)
BASOPHILS NFR BLD AUTO: 0.6 % (ref 0–1.5)
BH CV ECHO MEAS - AO MAX PG: 9.5 MMHG
BH CV ECHO MEAS - AO MEAN PG: 5 MMHG
BH CV ECHO MEAS - AO ROOT DIAM: 2.9 CM
BH CV ECHO MEAS - AO V2 MAX: 154 CM/SEC
BH CV ECHO MEAS - AO V2 VTI: 35.3 CM
BH CV ECHO MEAS - AVA(I,D): 1.94 CM2
BH CV ECHO MEAS - EDV(CUBED): 79.5 ML
BH CV ECHO MEAS - EDV(MOD-SP2): 61.1 ML
BH CV ECHO MEAS - EDV(MOD-SP4): 79.3 ML
BH CV ECHO MEAS - EF(MOD-BP): 64.4 %
BH CV ECHO MEAS - EF(MOD-SP2): 59.1 %
BH CV ECHO MEAS - EF(MOD-SP4): 68.5 %
BH CV ECHO MEAS - ESV(CUBED): 27 ML
BH CV ECHO MEAS - ESV(MOD-SP2): 25 ML
BH CV ECHO MEAS - ESV(MOD-SP4): 25 ML
BH CV ECHO MEAS - FS: 30.2 %
BH CV ECHO MEAS - IVS/LVPW: 1 CM
BH CV ECHO MEAS - IVSD: 1.3 CM
BH CV ECHO MEAS - LA DIMENSION: 3.5 CM
BH CV ECHO MEAS - LAT PEAK E' VEL: 7.1 CM/SEC
BH CV ECHO MEAS - LV MASS(C)D: 207.8 GRAMS
BH CV ECHO MEAS - LV MAX PG: 4.1 MMHG
BH CV ECHO MEAS - LV MEAN PG: 2 MMHG
BH CV ECHO MEAS - LV V1 MAX: 101 CM/SEC
BH CV ECHO MEAS - LV V1 VTI: 21.8 CM
BH CV ECHO MEAS - LVIDD: 4.3 CM
BH CV ECHO MEAS - LVIDS: 3 CM
BH CV ECHO MEAS - LVOT AREA: 3.1 CM2
BH CV ECHO MEAS - LVOT DIAM: 2 CM
BH CV ECHO MEAS - LVPWD: 1.3 CM
BH CV ECHO MEAS - MED PEAK E' VEL: 8.6 CM/SEC
BH CV ECHO MEAS - MV A MAX VEL: 86.5 CM/SEC
BH CV ECHO MEAS - MV DEC SLOPE: 342 CM/SEC2
BH CV ECHO MEAS - MV DEC TIME: 0.25 MSEC
BH CV ECHO MEAS - MV E MAX VEL: 78.1 CM/SEC
BH CV ECHO MEAS - MV E/A: 0.9
BH CV ECHO MEAS - MV P1/2T: 91.6 MSEC
BH CV ECHO MEAS - MVA(P1/2T): 2.4 CM2
BH CV ECHO MEAS - SV(LVOT): 68.5 ML
BH CV ECHO MEAS - SV(MOD-SP2): 36.1 ML
BH CV ECHO MEAS - SV(MOD-SP4): 54.3 ML
BH CV ECHO MEAS - TAPSE (>1.6): 1.8 CM
BH CV ECHO MEASUREMENTS AVERAGE E/E' RATIO: 9.95
BH CV VAS BP RIGHT ARM: NORMAL MMHG
BH CV XLRA - RV BASE: 3.4 CM
BH CV XLRA - RV LENGTH: 6.7 CM
BH CV XLRA - RV MID: 3.1 CM
BH CV XLRA - TDI S': 19.7 CM/SEC
BH CV XLRA MEAS LEFT DIST CCA EDV: 18.8 CM/SEC
BH CV XLRA MEAS LEFT DIST CCA PSV: 84.7 CM/SEC
BH CV XLRA MEAS LEFT DIST ICA EDV: 15.9 CM/SEC
BH CV XLRA MEAS LEFT DIST ICA PSV: 64.2 CM/SEC
BH CV XLRA MEAS LEFT ICA/CCA RATIO: 3.2
BH CV XLRA MEAS LEFT MID CCA EDV: 18.8 CM/SEC
BH CV XLRA MEAS LEFT MID CCA PSV: 91.7 CM/SEC
BH CV XLRA MEAS LEFT MID ICA EDV: 17.4 CM/SEC
BH CV XLRA MEAS LEFT MID ICA PSV: 78.4 CM/SEC
BH CV XLRA MEAS LEFT PROX CCA EDV: 21.2 CM/SEC
BH CV XLRA MEAS LEFT PROX CCA PSV: 98 CM/SEC
BH CV XLRA MEAS LEFT PROX ECA PSV: 162 CM/SEC
BH CV XLRA MEAS LEFT PROX ICA EDV: 65.9 CM/SEC
BH CV XLRA MEAS LEFT PROX ICA PSV: 296 CM/SEC
BH CV XLRA MEAS LEFT PROX SCLA PSV: 136 CM/SEC
BH CV XLRA MEAS LEFT VERTEBRAL A PSV: 66.8 CM/SEC
BH CV XLRA MEAS RIGHT DIST CCA EDV: 21.7 CM/SEC
BH CV XLRA MEAS RIGHT DIST CCA PSV: 90.1 CM/SEC
BH CV XLRA MEAS RIGHT DIST ICA EDV: 23.6 CM/SEC
BH CV XLRA MEAS RIGHT DIST ICA PSV: 90.1 CM/SEC
BH CV XLRA MEAS RIGHT ICA/CCA RATIO: 1.59
BH CV XLRA MEAS RIGHT MID CCA EDV: 18.2 CM/SEC
BH CV XLRA MEAS RIGHT MID CCA PSV: 92.7 CM/SEC
BH CV XLRA MEAS RIGHT MID ICA EDV: 40.8 CM/SEC
BH CV XLRA MEAS RIGHT MID ICA PSV: 147 CM/SEC
BH CV XLRA MEAS RIGHT PROX CCA EDV: 16.5 CM/SEC
BH CV XLRA MEAS RIGHT PROX CCA PSV: 86.6 CM/SEC
BH CV XLRA MEAS RIGHT PROX ECA PSV: 127 CM/SEC
BH CV XLRA MEAS RIGHT PROX ICA EDV: 37.6 CM/SEC
BH CV XLRA MEAS RIGHT PROX ICA PSV: 114 CM/SEC
BH CV XLRA MEAS RIGHT PROX SCLA PSV: 226 CM/SEC
BH CV XLRA MEAS RIGHT VERTEBRAL A PSV: 44.1 CM/SEC
BUN SERPL-MCNC: 21 MG/DL (ref 8–23)
BUN/CREAT SERPL: 19.8 (ref 7–25)
CALCIUM SPEC-SCNC: 9 MG/DL (ref 8.6–10.5)
CHLORIDE SERPL-SCNC: 104 MMOL/L (ref 98–107)
CHOLEST SERPL-MCNC: 191 MG/DL (ref 0–200)
CO2 SERPL-SCNC: 25 MMOL/L (ref 22–29)
CREAT SERPL-MCNC: 1.06 MG/DL (ref 0.57–1)
D DIMER PPP FEU-MCNC: 0.88 MCGFEU/ML (ref 0.01–0.5)
DEPRECATED RDW RBC AUTO: 37.2 FL (ref 37–54)
EGFRCR SERPLBLD CKD-EPI 2021: 58.8 ML/MIN/1.73
EOSINOPHIL # BLD AUTO: 1.11 10*3/MM3 (ref 0–0.4)
EOSINOPHIL NFR BLD AUTO: 12.8 % (ref 0.3–6.2)
ERYTHROCYTE [DISTWIDTH] IN BLOOD BY AUTOMATED COUNT: 12.1 % (ref 12.3–15.4)
GLUCOSE BLDC GLUCOMTR-MCNC: 102 MG/DL (ref 70–130)
GLUCOSE BLDC GLUCOMTR-MCNC: 122 MG/DL (ref 70–130)
GLUCOSE BLDC GLUCOMTR-MCNC: 124 MG/DL (ref 70–130)
GLUCOSE SERPL-MCNC: 112 MG/DL (ref 65–99)
HBA1C MFR BLD: 6.4 % (ref 4.8–5.6)
HCT VFR BLD AUTO: 34.4 % (ref 34–46.6)
HDLC SERPL-MCNC: 38 MG/DL (ref 40–60)
HGB BLD-MCNC: 11.8 G/DL (ref 12–15.9)
IMM GRANULOCYTES # BLD AUTO: 0.02 10*3/MM3 (ref 0–0.05)
IMM GRANULOCYTES NFR BLD AUTO: 0.2 % (ref 0–0.5)
IVRT: 99 MSEC
LDLC SERPL CALC-MCNC: 102 MG/DL (ref 0–100)
LDLC/HDLC SERPL: 2.45 {RATIO}
LEFT ATRIUM VOLUME INDEX: 19.4 ML/M2
LEFT ATRIUM VOLUME: 34.3 ML
LYMPHOCYTES # BLD AUTO: 3.19 10*3/MM3 (ref 0.7–3.1)
LYMPHOCYTES NFR BLD AUTO: 36.8 % (ref 19.6–45.3)
MAGNESIUM SERPL-MCNC: 1.9 MG/DL (ref 1.6–2.4)
MAXIMAL PREDICTED HEART RATE: 156 BPM
MAXIMAL PREDICTED HEART RATE: 156 BPM
MCH RBC QN AUTO: 28.9 PG (ref 26.6–33)
MCHC RBC AUTO-ENTMCNC: 34.3 G/DL (ref 31.5–35.7)
MCV RBC AUTO: 84.1 FL (ref 79–97)
MONOCYTES # BLD AUTO: 0.58 10*3/MM3 (ref 0.1–0.9)
MONOCYTES NFR BLD AUTO: 6.7 % (ref 5–12)
NEUTROPHILS NFR BLD AUTO: 3.72 10*3/MM3 (ref 1.7–7)
NEUTROPHILS NFR BLD AUTO: 42.9 % (ref 42.7–76)
NRBC BLD AUTO-RTO: 0 /100 WBC (ref 0–0.2)
PLATELET # BLD AUTO: 216 10*3/MM3 (ref 140–450)
PMV BLD AUTO: 11.4 FL (ref 6–12)
POTASSIUM SERPL-SCNC: 4 MMOL/L (ref 3.5–5.2)
RBC # BLD AUTO: 4.09 10*6/MM3 (ref 3.77–5.28)
RIGHT ARM BP: NORMAL MMHG
SODIUM SERPL-SCNC: 140 MMOL/L (ref 136–145)
STRESS TARGET HR: 133 BPM
STRESS TARGET HR: 133 BPM
TRIGL SERPL-MCNC: 300 MG/DL (ref 0–150)
TSH SERPL DL<=0.05 MIU/L-ACNC: 3.59 UIU/ML (ref 0.27–4.2)
VLDLC SERPL-MCNC: 51 MG/DL (ref 5–40)
WBC NRBC COR # BLD: 8.67 10*3/MM3 (ref 3.4–10.8)

## 2022-08-24 PROCEDURE — 96361 HYDRATE IV INFUSION ADD-ON: CPT

## 2022-08-24 PROCEDURE — G0378 HOSPITAL OBSERVATION PER HR: HCPCS

## 2022-08-24 PROCEDURE — 83735 ASSAY OF MAGNESIUM: CPT | Performed by: NURSE PRACTITIONER

## 2022-08-24 PROCEDURE — 93306 TTE W/DOPPLER COMPLETE: CPT | Performed by: INTERNAL MEDICINE

## 2022-08-24 PROCEDURE — 93880 EXTRACRANIAL BILAT STUDY: CPT

## 2022-08-24 PROCEDURE — 83036 HEMOGLOBIN GLYCOSYLATED A1C: CPT | Performed by: NURSE PRACTITIONER

## 2022-08-24 PROCEDURE — 99212 OFFICE O/P EST SF 10 MIN: CPT | Performed by: NURSE PRACTITIONER

## 2022-08-24 PROCEDURE — 80061 LIPID PANEL: CPT | Performed by: NURSE PRACTITIONER

## 2022-08-24 PROCEDURE — 93880 EXTRACRANIAL BILAT STUDY: CPT | Performed by: INTERNAL MEDICINE

## 2022-08-24 PROCEDURE — 93306 TTE W/DOPPLER COMPLETE: CPT

## 2022-08-24 PROCEDURE — 85025 COMPLETE CBC W/AUTO DIFF WBC: CPT | Performed by: NURSE PRACTITIONER

## 2022-08-24 PROCEDURE — 84443 ASSAY THYROID STIM HORMONE: CPT | Performed by: NURSE PRACTITIONER

## 2022-08-24 PROCEDURE — 82962 GLUCOSE BLOOD TEST: CPT

## 2022-08-24 PROCEDURE — 96360 HYDRATION IV INFUSION INIT: CPT

## 2022-08-24 PROCEDURE — 80048 BASIC METABOLIC PNL TOTAL CA: CPT | Performed by: NURSE PRACTITIONER

## 2022-08-24 PROCEDURE — 99220 PR INITIAL OBSERVATION CARE/DAY 70 MINUTES: CPT | Performed by: INTERNAL MEDICINE

## 2022-08-24 RX ORDER — OXYBUTYNIN CHLORIDE 5 MG/1
5 TABLET ORAL 2 TIMES DAILY
Status: DISCONTINUED | OUTPATIENT
Start: 2022-08-24 | End: 2022-08-26 | Stop reason: HOSPADM

## 2022-08-24 RX ORDER — SODIUM CHLORIDE 0.9 % (FLUSH) 0.9 %
10 SYRINGE (ML) INJECTION AS NEEDED
Status: DISCONTINUED | OUTPATIENT
Start: 2022-08-24 | End: 2022-08-26 | Stop reason: HOSPADM

## 2022-08-24 RX ORDER — SODIUM CHLORIDE 0.9 % (FLUSH) 0.9 %
10 SYRINGE (ML) INJECTION EVERY 12 HOURS SCHEDULED
Status: DISCONTINUED | OUTPATIENT
Start: 2022-08-24 | End: 2022-08-26 | Stop reason: HOSPADM

## 2022-08-24 RX ORDER — ASPIRIN 325 MG
325 TABLET, DELAYED RELEASE (ENTERIC COATED) ORAL NIGHTLY
Status: DISCONTINUED | OUTPATIENT
Start: 2022-08-24 | End: 2022-08-26 | Stop reason: HOSPADM

## 2022-08-24 RX ORDER — DEXTROMETHORPHAN POLISTIREX 30 MG/5ML
60 SUSPENSION ORAL EVERY 12 HOURS SCHEDULED
Status: DISCONTINUED | OUTPATIENT
Start: 2022-08-24 | End: 2022-08-26 | Stop reason: HOSPADM

## 2022-08-24 RX ORDER — FLUOXETINE HYDROCHLORIDE 20 MG/1
40 CAPSULE ORAL DAILY
Status: DISCONTINUED | OUTPATIENT
Start: 2022-08-24 | End: 2022-08-26 | Stop reason: HOSPADM

## 2022-08-24 RX ORDER — DEXTROSE MONOHYDRATE 25 G/50ML
25 INJECTION, SOLUTION INTRAVENOUS
Status: DISCONTINUED | OUTPATIENT
Start: 2022-08-24 | End: 2022-08-26 | Stop reason: HOSPADM

## 2022-08-24 RX ORDER — PANTOPRAZOLE SODIUM 40 MG/1
40 TABLET, DELAYED RELEASE ORAL DAILY
Status: DISCONTINUED | OUTPATIENT
Start: 2022-08-24 | End: 2022-08-26 | Stop reason: HOSPADM

## 2022-08-24 RX ORDER — AMLODIPINE BESYLATE 10 MG/1
10 TABLET ORAL DAILY
Status: DISCONTINUED | OUTPATIENT
Start: 2022-08-24 | End: 2022-08-26 | Stop reason: HOSPADM

## 2022-08-24 RX ORDER — INSULIN LISPRO 100 [IU]/ML
0-7 INJECTION, SOLUTION INTRAVENOUS; SUBCUTANEOUS
Status: DISCONTINUED | OUTPATIENT
Start: 2022-08-24 | End: 2022-08-26 | Stop reason: HOSPADM

## 2022-08-24 RX ORDER — SODIUM CHLORIDE 9 MG/ML
75 INJECTION, SOLUTION INTRAVENOUS CONTINUOUS
Status: ACTIVE | OUTPATIENT
Start: 2022-08-24 | End: 2022-08-24

## 2022-08-24 RX ORDER — NICOTINE POLACRILEX 4 MG
15 LOZENGE BUCCAL
Status: DISCONTINUED | OUTPATIENT
Start: 2022-08-24 | End: 2022-08-26 | Stop reason: HOSPADM

## 2022-08-24 RX ADMIN — DEXTROMETHORPHAN POLISTIREX 60 MG: 30 SUSPENSION ORAL at 12:20

## 2022-08-24 RX ADMIN — Medication 10 ML: at 21:49

## 2022-08-24 RX ADMIN — Medication 10 ML: at 03:01

## 2022-08-24 RX ADMIN — PANTOPRAZOLE SODIUM 40 MG: 40 TABLET, DELAYED RELEASE ORAL at 08:57

## 2022-08-24 RX ADMIN — OXYBUTYNIN CHLORIDE 5 MG: 5 TABLET ORAL at 08:57

## 2022-08-24 RX ADMIN — SODIUM CHLORIDE 75 ML/HR: 9 INJECTION, SOLUTION INTRAVENOUS at 03:01

## 2022-08-24 RX ADMIN — OXYBUTYNIN CHLORIDE 5 MG: 5 TABLET ORAL at 21:49

## 2022-08-24 RX ADMIN — ASPIRIN 325 MG: 325 TABLET, COATED ORAL at 03:01

## 2022-08-24 RX ADMIN — FLUOXETINE 40 MG: 20 CAPSULE ORAL at 08:57

## 2022-08-24 RX ADMIN — DEXTROMETHORPHAN POLISTIREX 60 MG: 30 SUSPENSION ORAL at 21:49

## 2022-08-24 RX ADMIN — Medication 10 ML: at 08:57

## 2022-08-24 RX ADMIN — ASPIRIN 325 MG: 325 TABLET, COATED ORAL at 21:48

## 2022-08-24 RX ADMIN — AMLODIPINE BESYLATE 10 MG: 10 TABLET ORAL at 08:57

## 2022-08-25 LAB
ALBUMIN SERPL-MCNC: 4.1 G/DL (ref 3.5–5.2)
ALBUMIN/GLOB SERPL: 1.4 G/DL
ALP SERPL-CCNC: 70 U/L (ref 39–117)
ALT SERPL W P-5'-P-CCNC: 16 U/L (ref 1–33)
ANION GAP SERPL CALCULATED.3IONS-SCNC: 13 MMOL/L (ref 5–15)
AST SERPL-CCNC: 25 U/L (ref 1–32)
BASOPHILS # BLD AUTO: 0.05 10*3/MM3 (ref 0–0.2)
BASOPHILS NFR BLD AUTO: 0.8 % (ref 0–1.5)
BILIRUB SERPL-MCNC: 0.4 MG/DL (ref 0–1.2)
BUN SERPL-MCNC: 11 MG/DL (ref 8–23)
BUN/CREAT SERPL: 11.3 (ref 7–25)
CALCIUM SPEC-SCNC: 8.4 MG/DL (ref 8.6–10.5)
CHLORIDE SERPL-SCNC: 104 MMOL/L (ref 98–107)
CO2 SERPL-SCNC: 23 MMOL/L (ref 22–29)
CREAT SERPL-MCNC: 0.97 MG/DL (ref 0.57–1)
DEPRECATED RDW RBC AUTO: 36.6 FL (ref 37–54)
EGFRCR SERPLBLD CKD-EPI 2021: 65.4 ML/MIN/1.73
EOSINOPHIL # BLD AUTO: 0.87 10*3/MM3 (ref 0–0.4)
EOSINOPHIL NFR BLD AUTO: 14.3 % (ref 0.3–6.2)
ERYTHROCYTE [DISTWIDTH] IN BLOOD BY AUTOMATED COUNT: 11.9 % (ref 12.3–15.4)
GLOBULIN UR ELPH-MCNC: 2.9 GM/DL
GLUCOSE BLDC GLUCOMTR-MCNC: 109 MG/DL (ref 70–130)
GLUCOSE BLDC GLUCOMTR-MCNC: 123 MG/DL (ref 70–130)
GLUCOSE BLDC GLUCOMTR-MCNC: 126 MG/DL (ref 70–130)
GLUCOSE BLDC GLUCOMTR-MCNC: 217 MG/DL (ref 70–130)
GLUCOSE SERPL-MCNC: 129 MG/DL (ref 65–99)
HCT VFR BLD AUTO: 35 % (ref 34–46.6)
HGB BLD-MCNC: 11.9 G/DL (ref 12–15.9)
IMM GRANULOCYTES # BLD AUTO: 0.02 10*3/MM3 (ref 0–0.05)
IMM GRANULOCYTES NFR BLD AUTO: 0.3 % (ref 0–0.5)
LYMPHOCYTES # BLD AUTO: 2.4 10*3/MM3 (ref 0.7–3.1)
LYMPHOCYTES NFR BLD AUTO: 39.5 % (ref 19.6–45.3)
MCH RBC QN AUTO: 28.9 PG (ref 26.6–33)
MCHC RBC AUTO-ENTMCNC: 34 G/DL (ref 31.5–35.7)
MCV RBC AUTO: 85 FL (ref 79–97)
MONOCYTES # BLD AUTO: 0.4 10*3/MM3 (ref 0.1–0.9)
MONOCYTES NFR BLD AUTO: 6.6 % (ref 5–12)
NEUTROPHILS NFR BLD AUTO: 2.33 10*3/MM3 (ref 1.7–7)
NEUTROPHILS NFR BLD AUTO: 38.5 % (ref 42.7–76)
NRBC BLD AUTO-RTO: 0 /100 WBC (ref 0–0.2)
PLATELET # BLD AUTO: 215 10*3/MM3 (ref 140–450)
PMV BLD AUTO: 11.3 FL (ref 6–12)
POTASSIUM SERPL-SCNC: 3.4 MMOL/L (ref 3.5–5.2)
POTASSIUM SERPL-SCNC: 4.2 MMOL/L (ref 3.5–5.2)
PROT SERPL-MCNC: 7 G/DL (ref 6–8.5)
RBC # BLD AUTO: 4.12 10*6/MM3 (ref 3.77–5.28)
SODIUM SERPL-SCNC: 140 MMOL/L (ref 136–145)
WBC NRBC COR # BLD: 6.07 10*3/MM3 (ref 3.4–10.8)

## 2022-08-25 PROCEDURE — G0378 HOSPITAL OBSERVATION PER HR: HCPCS

## 2022-08-25 PROCEDURE — 99225 PR SBSQ OBSERVATION CARE/DAY 25 MINUTES: CPT | Performed by: HOSPITALIST

## 2022-08-25 PROCEDURE — 85025 COMPLETE CBC W/AUTO DIFF WBC: CPT | Performed by: INTERNAL MEDICINE

## 2022-08-25 PROCEDURE — 84132 ASSAY OF SERUM POTASSIUM: CPT | Performed by: HOSPITALIST

## 2022-08-25 PROCEDURE — 99214 OFFICE O/P EST MOD 30 MIN: CPT | Performed by: RADIOLOGY

## 2022-08-25 PROCEDURE — 99214 OFFICE O/P EST MOD 30 MIN: CPT | Performed by: NURSE PRACTITIONER

## 2022-08-25 PROCEDURE — 82962 GLUCOSE BLOOD TEST: CPT

## 2022-08-25 PROCEDURE — 80053 COMPREHEN METABOLIC PANEL: CPT | Performed by: INTERNAL MEDICINE

## 2022-08-25 RX ORDER — CLOPIDOGREL BISULFATE 75 MG/1
75 TABLET ORAL DAILY
Status: DISCONTINUED | OUTPATIENT
Start: 2022-08-25 | End: 2022-08-26 | Stop reason: HOSPADM

## 2022-08-25 RX ORDER — POTASSIUM CHLORIDE 750 MG/1
40 CAPSULE, EXTENDED RELEASE ORAL AS NEEDED
Status: DISCONTINUED | OUTPATIENT
Start: 2022-08-25 | End: 2022-08-26 | Stop reason: HOSPADM

## 2022-08-25 RX ORDER — POTASSIUM CHLORIDE 7.45 MG/ML
10 INJECTION INTRAVENOUS
Status: DISCONTINUED | OUTPATIENT
Start: 2022-08-25 | End: 2022-08-26 | Stop reason: HOSPADM

## 2022-08-25 RX ORDER — LOSARTAN POTASSIUM 50 MG/1
100 TABLET ORAL DAILY
Status: DISCONTINUED | OUTPATIENT
Start: 2022-08-25 | End: 2022-08-26 | Stop reason: HOSPADM

## 2022-08-25 RX ORDER — POTASSIUM CHLORIDE 1.5 G/1.77G
40 POWDER, FOR SOLUTION ORAL AS NEEDED
Status: DISCONTINUED | OUTPATIENT
Start: 2022-08-25 | End: 2022-08-26 | Stop reason: HOSPADM

## 2022-08-25 RX ADMIN — ASPIRIN 325 MG: 325 TABLET, COATED ORAL at 21:23

## 2022-08-25 RX ADMIN — FLUOXETINE 40 MG: 20 CAPSULE ORAL at 08:46

## 2022-08-25 RX ADMIN — POTASSIUM CHLORIDE 40 MEQ: 750 CAPSULE, EXTENDED RELEASE ORAL at 14:10

## 2022-08-25 RX ADMIN — OXYBUTYNIN CHLORIDE 5 MG: 5 TABLET ORAL at 21:23

## 2022-08-25 RX ADMIN — Medication 10 ML: at 21:23

## 2022-08-25 RX ADMIN — CLOPIDOGREL BISULFATE 75 MG: 75 TABLET ORAL at 12:48

## 2022-08-25 RX ADMIN — DEXTROMETHORPHAN POLISTIREX 60 MG: 30 SUSPENSION ORAL at 08:48

## 2022-08-25 RX ADMIN — AMLODIPINE BESYLATE 10 MG: 10 TABLET ORAL at 08:46

## 2022-08-25 RX ADMIN — Medication 10 ML: at 08:47

## 2022-08-25 RX ADMIN — POTASSIUM CHLORIDE 40 MEQ: 750 CAPSULE, EXTENDED RELEASE ORAL at 08:46

## 2022-08-25 RX ADMIN — DEXTROMETHORPHAN POLISTIREX 60 MG: 30 SUSPENSION ORAL at 21:23

## 2022-08-25 RX ADMIN — LOSARTAN POTASSIUM 100 MG: 50 TABLET, FILM COATED ORAL at 08:45

## 2022-08-25 RX ADMIN — PANTOPRAZOLE SODIUM 40 MG: 40 TABLET, DELAYED RELEASE ORAL at 08:46

## 2022-08-25 RX ADMIN — OXYBUTYNIN CHLORIDE 5 MG: 5 TABLET ORAL at 08:46

## 2022-08-26 ENCOUNTER — READMISSION MANAGEMENT (OUTPATIENT)
Dept: CALL CENTER | Facility: HOSPITAL | Age: 62
End: 2022-08-26

## 2022-08-26 VITALS
SYSTOLIC BLOOD PRESSURE: 159 MMHG | WEIGHT: 168.43 LBS | HEIGHT: 62 IN | OXYGEN SATURATION: 94 % | HEART RATE: 62 BPM | RESPIRATION RATE: 18 BRPM | BODY MASS INDEX: 31 KG/M2 | TEMPERATURE: 98.1 F | DIASTOLIC BLOOD PRESSURE: 80 MMHG

## 2022-08-26 PROBLEM — R42 DIZZINESS: Status: RESOLVED | Noted: 2022-08-24 | Resolved: 2022-08-26

## 2022-08-26 PROBLEM — G45.9 TRANSIENT ISCHEMIC ATTACK (TIA): Status: ACTIVE | Noted: 2022-08-26

## 2022-08-26 PROBLEM — N17.9 ACUTE KIDNEY INJURY: Status: RESOLVED | Noted: 2022-08-24 | Resolved: 2022-08-26

## 2022-08-26 PROBLEM — R47.81 SLURRED SPEECH: Status: RESOLVED | Noted: 2022-08-24 | Resolved: 2022-08-26

## 2022-08-26 LAB
ANION GAP SERPL CALCULATED.3IONS-SCNC: 13 MMOL/L (ref 5–15)
BUN SERPL-MCNC: 14 MG/DL (ref 8–23)
BUN/CREAT SERPL: 13.9 (ref 7–25)
CALCIUM SPEC-SCNC: 9 MG/DL (ref 8.6–10.5)
CHLORIDE SERPL-SCNC: 108 MMOL/L (ref 98–107)
CO2 SERPL-SCNC: 21 MMOL/L (ref 22–29)
CREAT SERPL-MCNC: 1.01 MG/DL (ref 0.57–1)
EGFRCR SERPLBLD CKD-EPI 2021: 62.3 ML/MIN/1.73
GLUCOSE BLDC GLUCOMTR-MCNC: 146 MG/DL (ref 70–130)
GLUCOSE SERPL-MCNC: 129 MG/DL (ref 65–99)
PA ADP PRP-ACNC: 229 PRU
POTASSIUM SERPL-SCNC: 4.1 MMOL/L (ref 3.5–5.2)
SODIUM SERPL-SCNC: 142 MMOL/L (ref 136–145)

## 2022-08-26 PROCEDURE — 99152 MOD SED SAME PHYS/QHP 5/>YRS: CPT | Performed by: RADIOLOGY

## 2022-08-26 PROCEDURE — 0 IODIXANOL PER 1 ML: Performed by: RADIOLOGY

## 2022-08-26 PROCEDURE — 99153 MOD SED SAME PHYS/QHP EA: CPT | Performed by: RADIOLOGY

## 2022-08-26 PROCEDURE — 36225 PLACE CATH SUBCLAVIAN ART: CPT | Performed by: RADIOLOGY

## 2022-08-26 PROCEDURE — C1769 GUIDE WIRE: HCPCS | Performed by: RADIOLOGY

## 2022-08-26 PROCEDURE — 36226 PLACE CATH VERTEBRAL ART: CPT | Performed by: RADIOLOGY

## 2022-08-26 PROCEDURE — 25010000002 MIDAZOLAM PER 1 MG: Performed by: RADIOLOGY

## 2022-08-26 PROCEDURE — G0378 HOSPITAL OBSERVATION PER HR: HCPCS

## 2022-08-26 PROCEDURE — 25010000002 FENTANYL CITRATE (PF) 50 MCG/ML SOLUTION: Performed by: RADIOLOGY

## 2022-08-26 PROCEDURE — 36223 PLACE CATH CAROTID/INOM ART: CPT | Performed by: RADIOLOGY

## 2022-08-26 PROCEDURE — 82962 GLUCOSE BLOOD TEST: CPT

## 2022-08-26 PROCEDURE — 99217 PR OBSERVATION CARE DISCHARGE MANAGEMENT: CPT | Performed by: HOSPITALIST

## 2022-08-26 PROCEDURE — 85576 BLOOD PLATELET AGGREGATION: CPT | Performed by: NURSE PRACTITIONER

## 2022-08-26 PROCEDURE — 80048 BASIC METABOLIC PNL TOTAL CA: CPT | Performed by: HOSPITALIST

## 2022-08-26 PROCEDURE — 97165 OT EVAL LOW COMPLEX 30 MIN: CPT

## 2022-08-26 PROCEDURE — 76937 US GUIDE VASCULAR ACCESS: CPT | Performed by: RADIOLOGY

## 2022-08-26 PROCEDURE — C1894 INTRO/SHEATH, NON-LASER: HCPCS | Performed by: RADIOLOGY

## 2022-08-26 PROCEDURE — 99214 OFFICE O/P EST MOD 30 MIN: CPT

## 2022-08-26 RX ORDER — MIDAZOLAM HYDROCHLORIDE 1 MG/ML
INJECTION INTRAMUSCULAR; INTRAVENOUS AS NEEDED
Status: DISCONTINUED | OUTPATIENT
Start: 2022-08-26 | End: 2022-08-26 | Stop reason: HOSPADM

## 2022-08-26 RX ORDER — MECLIZINE HYDROCHLORIDE 25 MG/1
25 TABLET ORAL 3 TIMES DAILY PRN
Start: 2022-08-26

## 2022-08-26 RX ORDER — ASPIRIN 81 MG/1
81 TABLET ORAL DAILY
Start: 2022-08-26 | End: 2022-09-14 | Stop reason: HOSPADM

## 2022-08-26 RX ORDER — CLOPIDOGREL BISULFATE 75 MG/1
75 TABLET ORAL DAILY
Qty: 30 TABLET | Refills: 0 | Status: SHIPPED | OUTPATIENT
Start: 2022-08-27

## 2022-08-26 RX ORDER — SODIUM CHLORIDE 0.9 % (FLUSH) 0.9 %
10 SYRINGE (ML) INJECTION EVERY 12 HOURS SCHEDULED
Status: DISCONTINUED | OUTPATIENT
Start: 2022-08-26 | End: 2022-08-26 | Stop reason: HOSPADM

## 2022-08-26 RX ORDER — ATORVASTATIN CALCIUM 80 MG/1
80 TABLET, FILM COATED ORAL DAILY
Qty: 30 TABLET | Refills: 0 | Status: SHIPPED | OUTPATIENT
Start: 2022-08-26 | End: 2022-09-30 | Stop reason: SDUPTHER

## 2022-08-26 RX ORDER — SODIUM CHLORIDE 9 MG/ML
75 INJECTION, SOLUTION INTRAVENOUS CONTINUOUS
Status: ACTIVE | OUTPATIENT
Start: 2022-08-26 | End: 2022-08-26

## 2022-08-26 RX ORDER — IODIXANOL 320 MG/ML
INJECTION, SOLUTION INTRAVASCULAR AS NEEDED
Status: DISCONTINUED | OUTPATIENT
Start: 2022-08-26 | End: 2022-08-26 | Stop reason: HOSPADM

## 2022-08-26 RX ORDER — LIDOCAINE HYDROCHLORIDE 10 MG/ML
INJECTION, SOLUTION EPIDURAL; INFILTRATION; INTRACAUDAL; PERINEURAL AS NEEDED
Status: DISCONTINUED | OUTPATIENT
Start: 2022-08-26 | End: 2022-08-26 | Stop reason: HOSPADM

## 2022-08-26 RX ORDER — SODIUM CHLORIDE 0.9 % (FLUSH) 0.9 %
10 SYRINGE (ML) INJECTION AS NEEDED
Status: DISCONTINUED | OUTPATIENT
Start: 2022-08-26 | End: 2022-08-26 | Stop reason: HOSPADM

## 2022-08-26 RX ORDER — FENTANYL CITRATE 50 UG/ML
INJECTION, SOLUTION INTRAMUSCULAR; INTRAVENOUS AS NEEDED
Status: DISCONTINUED | OUTPATIENT
Start: 2022-08-26 | End: 2022-08-26 | Stop reason: HOSPADM

## 2022-08-26 RX ADMIN — Medication 10 ML: at 10:26

## 2022-08-26 RX ADMIN — CLOPIDOGREL BISULFATE 75 MG: 75 TABLET ORAL at 08:15

## 2022-08-26 RX ADMIN — PANTOPRAZOLE SODIUM 40 MG: 40 TABLET, DELAYED RELEASE ORAL at 10:26

## 2022-08-26 RX ADMIN — LOSARTAN POTASSIUM 100 MG: 50 TABLET, FILM COATED ORAL at 10:24

## 2022-08-26 RX ADMIN — AMLODIPINE BESYLATE 10 MG: 10 TABLET ORAL at 10:25

## 2022-08-26 RX ADMIN — DEXTROMETHORPHAN POLISTIREX 60 MG: 30 SUSPENSION ORAL at 10:25

## 2022-08-26 RX ADMIN — FLUOXETINE 40 MG: 20 CAPSULE ORAL at 10:26

## 2022-08-26 RX ADMIN — OXYBUTYNIN CHLORIDE 5 MG: 5 TABLET ORAL at 10:26

## 2022-08-26 NOTE — OUTREACH NOTE
Prep Survey    Flowsheet Row Responses   [facility] [place]   Is LACE score < 7 ? Yes   Emergency Room discharge w/ pulse ox? No   Eligibility Readm Mgmt   Discharge diagnosis TIA  [COVID positive at home 08/16 patient not symptomatic]   Does the patient have one of the following disease processes/diagnoses(primary or secondary)? Stroke (TIA)   Does the patient have Home health ordered? No   Is there a DME ordered? No   Prep survey completed? Yes          SUKHWINDER LEAL - Registered Nurse

## 2022-08-29 LAB
QT INTERVAL: 488 MS
QTC INTERVAL: 483 MS

## 2022-08-31 ENCOUNTER — READMISSION MANAGEMENT (OUTPATIENT)
Dept: CALL CENTER | Facility: HOSPITAL | Age: 62
End: 2022-08-31

## 2022-09-02 ENCOUNTER — OFFICE VISIT (OUTPATIENT)
Dept: NEUROSURGERY | Facility: CLINIC | Age: 62
End: 2022-09-02

## 2022-09-02 VITALS — RESPIRATION RATE: 16 BRPM | HEIGHT: 62 IN | BODY MASS INDEX: 30.18 KG/M2 | WEIGHT: 164 LBS

## 2022-09-02 DIAGNOSIS — I65.22 LEFT CAROTID STENOSIS: Primary | ICD-10-CM

## 2022-09-02 PROCEDURE — 99214 OFFICE O/P EST MOD 30 MIN: CPT | Performed by: NEUROLOGICAL SURGERY

## 2022-09-02 RX ORDER — SODIUM CHLORIDE 0.9 % (FLUSH) 0.9 %
3-10 SYRINGE (ML) INJECTION AS NEEDED
Status: CANCELLED | OUTPATIENT
Start: 2022-09-02

## 2022-09-02 RX ORDER — CHLORHEXIDINE GLUCONATE 4 G/100ML
SOLUTION TOPICAL
Qty: 120 ML | Refills: 0 | Status: SHIPPED | OUTPATIENT
Start: 2022-09-02 | End: 2022-09-14 | Stop reason: HOSPADM

## 2022-09-02 RX ORDER — NIRMATRELVIR AND RITONAVIR 300-100 MG
KIT ORAL
Status: ON HOLD | COMMUNITY
Start: 2022-08-18 | End: 2022-09-13

## 2022-09-02 RX ORDER — SODIUM CHLORIDE 0.9 % (FLUSH) 0.9 %
3 SYRINGE (ML) INJECTION EVERY 12 HOURS SCHEDULED
Status: CANCELLED | OUTPATIENT
Start: 2022-09-02

## 2022-09-02 NOTE — PROGRESS NOTES
"Subjective     Chief Complaint: Symptomatic left internal carotid artery stenosis    Patient ID: Joie Garnica is a 62 y.o. female seen for consultation today at the request of  Rolando Ralph MD    History of Present Illness    This is a 62-year-old woman who underwent a diagnostic cerebral angiogram with my partner, Dr. Ralph, to evaluate her carotids for some anterior circulation TIAs that she has been having.  She has had several episodes of speech arrest in the last few months.  He did an angiogram and found that she had a high-grade, bulky, calcific plaque at the origin of the left internal carotid artery which was causing stenosis.  This is the presumptive culprit lesion for her left hemispheric TIAs.  She was referred to my clinic to discuss surgical treatment via carotid endarterectomy.    Her medical comorbidities are significant for moderate obesity, dyslipidemia, and hypertension.  She is a former smoker.    The following portions of the patient's history were reviewed and updated as appropriate: allergies, current medications, past family history, past medical history, past social history, past surgical history and problem list.    Family history:   Family History   Problem Relation Age of Onset   • No Known Problems Mother    • Heart attack Father        Social history:   Social History     Socioeconomic History   • Marital status:    Tobacco Use   • Smoking status: Never Smoker   • Smokeless tobacco: Never Used   Substance and Sexual Activity   • Alcohol use: Yes     Comment: rarely   • Drug use: No   • Sexual activity: Defer       Review of Systems    Objective   Resp. rate 16, height 157.5 cm (62\"), weight 74.4 kg (164 lb).  Body mass index is 30 kg/m².    Physical Exam  Constitutional:       General: She is not in acute distress.     Appearance: She is well-developed. She is not diaphoretic.   HENT:      Head: Normocephalic and atraumatic.   Pulmonary:      Effort: Pulmonary effort " is normal.   Skin:     General: Skin is warm and dry.   Neurological:      Mental Status: She is alert and oriented to person, place, and time.      Cranial Nerves: No cranial nerve deficit.      Comments: Speech production is fluent with no paraphasic errors.  Naming and repetition are intact.    Cranial nerves II through XII are grossly intact.    She has no pronator drift, and finger-to-nose testing is performed without bradykinesia or dysmetria.         Assessment & Plan     Independent Review of Radiographic Studies:      Available for my review is a cerebral angiogram that was performed by Dr. Ralph on 8/26/2022.  There is a high-grade, greater than 70% stenosis at the origin of the left internal carotid artery with a bulky, calcific plaque.  T    Medical Decision Making:      This is a 62-year-old woman with symptomatic left internal carotid artery stenosis.  She does not have any high risk factors for carotid endarterectomy.      Informed consent for a carotid endarterectomy was obtained from the patient. She acknowledges a risk of stroke, death, pain, paralysis, coma, blindness, permanent neurologic disability, bleeding, infection, recurrent laryngeal nerve injury, hoarseness, dysphagia, hypoglossal nerve injury, recurrent stenosis, failure of benefit of the operation, or need for additional procedures.  All questions were answered.  No guarantees were given or implied.  The patient elects to proceed with surgery.    We will schedule her on an elective basis for her left carotid endarterectomy.  She should remain on her aspirin and Plavix until that time.    There are no diagnoses linked to this encounter.    No follow-ups on file.           This document signed by NEYDA Ku MD September 2, 2022 13:31 EDT

## 2022-09-12 ENCOUNTER — ANESTHESIA EVENT (OUTPATIENT)
Dept: PERIOP | Facility: HOSPITAL | Age: 62
End: 2022-09-12

## 2022-09-12 ENCOUNTER — PRE-ADMISSION TESTING (OUTPATIENT)
Dept: PREADMISSION TESTING | Facility: HOSPITAL | Age: 62
End: 2022-09-12

## 2022-09-12 VITALS — WEIGHT: 162.92 LBS | HEIGHT: 62 IN | BODY MASS INDEX: 29.98 KG/M2

## 2022-09-12 DIAGNOSIS — I65.22 LEFT CAROTID STENOSIS: ICD-10-CM

## 2022-09-12 LAB
ANION GAP SERPL CALCULATED.3IONS-SCNC: 12 MMOL/L (ref 5–15)
BASOPHILS # BLD AUTO: 0.09 10*3/MM3 (ref 0–0.2)
BASOPHILS NFR BLD AUTO: 1.2 % (ref 0–1.5)
BUN SERPL-MCNC: 26 MG/DL (ref 8–23)
BUN/CREAT SERPL: 16.9 (ref 7–25)
CALCIUM SPEC-SCNC: 9.3 MG/DL (ref 8.6–10.5)
CHLORIDE SERPL-SCNC: 103 MMOL/L (ref 98–107)
CO2 SERPL-SCNC: 26 MMOL/L (ref 22–29)
CREAT SERPL-MCNC: 1.54 MG/DL (ref 0.57–1)
DEPRECATED RDW RBC AUTO: 38.5 FL (ref 37–54)
EGFRCR SERPLBLD CKD-EPI 2021: 37.5 ML/MIN/1.73
EOSINOPHIL # BLD AUTO: 1.02 10*3/MM3 (ref 0–0.4)
EOSINOPHIL NFR BLD AUTO: 14 % (ref 0.3–6.2)
ERYTHROCYTE [DISTWIDTH] IN BLOOD BY AUTOMATED COUNT: 12 % (ref 12.3–15.4)
GLUCOSE SERPL-MCNC: 183 MG/DL (ref 65–99)
HBA1C MFR BLD: 6.6 % (ref 4.8–5.6)
HCT VFR BLD AUTO: 36.8 % (ref 34–46.6)
HGB BLD-MCNC: 12.2 G/DL (ref 12–15.9)
IMM GRANULOCYTES # BLD AUTO: 0.01 10*3/MM3 (ref 0–0.05)
IMM GRANULOCYTES NFR BLD AUTO: 0.1 % (ref 0–0.5)
INR PPP: 0.96 (ref 0.84–1.13)
LYMPHOCYTES # BLD AUTO: 2.04 10*3/MM3 (ref 0.7–3.1)
LYMPHOCYTES NFR BLD AUTO: 28.1 % (ref 19.6–45.3)
MCH RBC QN AUTO: 28.9 PG (ref 26.6–33)
MCHC RBC AUTO-ENTMCNC: 33.2 G/DL (ref 31.5–35.7)
MCV RBC AUTO: 87.2 FL (ref 79–97)
MONOCYTES # BLD AUTO: 0.5 10*3/MM3 (ref 0.1–0.9)
MONOCYTES NFR BLD AUTO: 6.9 % (ref 5–12)
NEUTROPHILS NFR BLD AUTO: 3.6 10*3/MM3 (ref 1.7–7)
NEUTROPHILS NFR BLD AUTO: 49.7 % (ref 42.7–76)
NRBC BLD AUTO-RTO: 0 /100 WBC (ref 0–0.2)
PLATELET # BLD AUTO: 235 10*3/MM3 (ref 140–450)
PMV BLD AUTO: 11.8 FL (ref 6–12)
POTASSIUM SERPL-SCNC: 3.5 MMOL/L (ref 3.5–5.2)
PROTHROMBIN TIME: 12.7 SECONDS (ref 11.4–14.4)
QT INTERVAL: 448 MS
QTC INTERVAL: 454 MS
RBC # BLD AUTO: 4.22 10*6/MM3 (ref 3.77–5.28)
SODIUM SERPL-SCNC: 141 MMOL/L (ref 136–145)
WBC NRBC COR # BLD: 7.26 10*3/MM3 (ref 3.4–10.8)

## 2022-09-12 PROCEDURE — 93010 ELECTROCARDIOGRAM REPORT: CPT | Performed by: INTERNAL MEDICINE

## 2022-09-12 PROCEDURE — 80048 BASIC METABOLIC PNL TOTAL CA: CPT

## 2022-09-12 PROCEDURE — 85610 PROTHROMBIN TIME: CPT

## 2022-09-12 PROCEDURE — 93005 ELECTROCARDIOGRAM TRACING: CPT

## 2022-09-12 PROCEDURE — 85025 COMPLETE CBC W/AUTO DIFF WBC: CPT

## 2022-09-12 PROCEDURE — 83036 HEMOGLOBIN GLYCOSYLATED A1C: CPT

## 2022-09-12 PROCEDURE — 36415 COLL VENOUS BLD VENIPUNCTURE: CPT

## 2022-09-12 RX ORDER — SODIUM CHLORIDE 0.9 % (FLUSH) 0.9 %
10 SYRINGE (ML) INJECTION AS NEEDED
Status: CANCELLED | OUTPATIENT
Start: 2022-09-12

## 2022-09-12 RX ORDER — CHLORHEXIDINE GLUCONATE 4 G/100ML
SOLUTION TOPICAL
Status: ON HOLD | COMMUNITY
Start: 2022-09-03 | End: 2022-09-13

## 2022-09-12 RX ORDER — SODIUM CHLORIDE, SODIUM LACTATE, POTASSIUM CHLORIDE, CALCIUM CHLORIDE 600; 310; 30; 20 MG/100ML; MG/100ML; MG/100ML; MG/100ML
9 INJECTION, SOLUTION INTRAVENOUS CONTINUOUS
Status: CANCELLED | OUTPATIENT
Start: 2022-09-12

## 2022-09-12 RX ORDER — METOPROLOL SUCCINATE 50 MG/1
50 TABLET, EXTENDED RELEASE ORAL DAILY
COMMUNITY
Start: 2022-09-01

## 2022-09-12 RX ORDER — FAMOTIDINE 10 MG/ML
20 INJECTION, SOLUTION INTRAVENOUS ONCE
Status: CANCELLED | OUTPATIENT
Start: 2022-09-12 | End: 2022-09-12

## 2022-09-12 RX ORDER — SODIUM CHLORIDE 0.9 % (FLUSH) 0.9 %
10 SYRINGE (ML) INJECTION EVERY 12 HOURS SCHEDULED
Status: CANCELLED | OUTPATIENT
Start: 2022-09-12

## 2022-09-12 NOTE — PAT
Per Anesthesia Request, patient instructed not to take their ACE/ARB medications on the AM of surgery.  (LOSARTAN)    Patient instructed to drink 20 ounces of Gatorade and it needs to be completed 1 hour (for Main OR patients) or 2 hours (scheduled  section & [facility] patients) before given arrival time for procedure (NO RED Gatorade)    Patient verbalized understanding.    Patient to apply Chlorhexadine wipes  to surgical area (as instructed) the night before procedure and the AM of procedure. Wipes provided.    Prescription for Bactroban (if prescribed) and Chlorhexidine shower called into patient's pharmacy or [facility] pharmacy by patient's surgeon.  Reinforced with patient to  the prescription from applicable pharmacy if they haven't already.  Verbal and written instructions given regarding proper use of the Bactroban (if prescribed) and Chlorhexidine to patient and/or famlily during PAT visit. Patient/family also instructed to complete checklist and return it to Pre-op on the day of surgery.  Patient and/or family verbalized understanding.    EKGs and recent ECHO faxed to anesthesia and case reviewed with Dr. Herrera who stated patient can proceed without further testing.    Patient stated she has been prescribed Plavix but stopped taking it 2 days ago due to upcoming procedure.  Due to the nature of upcoming procedure, patient was encouraged to resume Plavix as long as Dr. Ku's office has not instructed otherwise.    Consent signed.

## 2022-09-13 ENCOUNTER — ANESTHESIA (OUTPATIENT)
Dept: PERIOP | Facility: HOSPITAL | Age: 62
End: 2022-09-13

## 2022-09-13 ENCOUNTER — ANESTHESIA EVENT CONVERTED (OUTPATIENT)
Dept: ANESTHESIOLOGY | Facility: HOSPITAL | Age: 62
End: 2022-09-13

## 2022-09-13 ENCOUNTER — APPOINTMENT (OUTPATIENT)
Dept: NEUROLOGY | Facility: HOSPITAL | Age: 62
End: 2022-09-13

## 2022-09-13 ENCOUNTER — HOSPITAL ENCOUNTER (INPATIENT)
Facility: HOSPITAL | Age: 62
LOS: 1 days | Discharge: HOME | End: 2022-09-14
Attending: NEUROLOGICAL SURGERY | Admitting: NEUROLOGICAL SURGERY

## 2022-09-13 DIAGNOSIS — G45.1 TIA INVOLVING LEFT INTERNAL CAROTID ARTERY: ICD-10-CM

## 2022-09-13 DIAGNOSIS — I65.22 STENOSIS OF LEFT CAROTID ARTERY: Primary | ICD-10-CM

## 2022-09-13 DIAGNOSIS — I65.22 LEFT CAROTID STENOSIS: ICD-10-CM

## 2022-09-13 PROBLEM — R73.9 HYPERGLYCEMIA: Status: ACTIVE | Noted: 2022-09-13

## 2022-09-13 PROBLEM — E78.5 HYPERLIPIDEMIA: Status: ACTIVE | Noted: 2022-09-13

## 2022-09-13 LAB
GLUCOSE BLDC GLUCOMTR-MCNC: 126 MG/DL (ref 70–130)
GLUCOSE BLDC GLUCOMTR-MCNC: 184 MG/DL (ref 70–130)
GLUCOSE BLDC GLUCOMTR-MCNC: 203 MG/DL (ref 70–130)
GLUCOSE BLDC GLUCOMTR-MCNC: 214 MG/DL (ref 70–130)

## 2022-09-13 PROCEDURE — 82962 GLUCOSE BLOOD TEST: CPT

## 2022-09-13 PROCEDURE — 88304 TISSUE EXAM BY PATHOLOGIST: CPT | Performed by: NEUROLOGICAL SURGERY

## 2022-09-13 PROCEDURE — 25010000002 PROPOFOL 10 MG/ML EMULSION: Performed by: NURSE ANESTHETIST, CERTIFIED REGISTERED

## 2022-09-13 PROCEDURE — 25010000002 CEFAZOLIN IN DEXTROSE 2-4 GM/100ML-% SOLUTION: Performed by: NEUROLOGICAL SURGERY

## 2022-09-13 PROCEDURE — 25010000002 FENTANYL CITRATE (PF) 50 MCG/ML SOLUTION: Performed by: NURSE ANESTHETIST, CERTIFIED REGISTERED

## 2022-09-13 PROCEDURE — 03CN0ZZ EXTIRPATION OF MATTER FROM LEFT EXTERNAL CAROTID ARTERY, OPEN APPROACH: ICD-10-PCS | Performed by: NEUROLOGICAL SURGERY

## 2022-09-13 PROCEDURE — 88311 DECALCIFY TISSUE: CPT | Performed by: NEUROLOGICAL SURGERY

## 2022-09-13 PROCEDURE — 25010000002 ONDANSETRON PER 1 MG: Performed by: NURSE ANESTHETIST, CERTIFIED REGISTERED

## 2022-09-13 PROCEDURE — 25010000002 HEPARIN (PORCINE) PER 1000 UNITS: Performed by: NURSE ANESTHETIST, CERTIFIED REGISTERED

## 2022-09-13 PROCEDURE — 25010000002 HEPARIN (PORCINE) PER 1000 UNITS: Performed by: NEUROLOGICAL SURGERY

## 2022-09-13 PROCEDURE — 95955 EEG DURING SURGERY: CPT

## 2022-09-13 PROCEDURE — 99223 1ST HOSP IP/OBS HIGH 75: CPT | Performed by: INTERNAL MEDICINE

## 2022-09-13 PROCEDURE — 25010000002 DEXAMETHASONE SODIUM PHOSPHATE 100 MG/10ML SOLUTION

## 2022-09-13 PROCEDURE — 95816 EEG AWAKE AND DROWSY: CPT

## 2022-09-13 PROCEDURE — 25010000002 PHENYLEPHRINE 10 MG/ML SOLUTION 1 ML VIAL: Performed by: NURSE ANESTHETIST, CERTIFIED REGISTERED

## 2022-09-13 PROCEDURE — 35301 RECHANNELING OF ARTERY: CPT | Performed by: PHYSICIAN ASSISTANT

## 2022-09-13 PROCEDURE — 35301 RECHANNELING OF ARTERY: CPT | Performed by: NEUROLOGICAL SURGERY

## 2022-09-13 DEVICE — CLIP LIGAT VASC HORIZON TI SM YEL 6CT: Type: IMPLANTABLE DEVICE | Site: CAROTID | Status: FUNCTIONAL

## 2022-09-13 DEVICE — HEMOST ABS SURGIFOAM SZ100 8X12 10MM: Type: IMPLANTABLE DEVICE | Site: CAROTID | Status: FUNCTIONAL

## 2022-09-13 DEVICE — CLIP LIGAT VASC HORIZON TI MD BLU 6CT: Type: IMPLANTABLE DEVICE | Site: CAROTID | Status: FUNCTIONAL

## 2022-09-13 DEVICE — FLOSEAL HEMOSTATIC MATRIX, 10ML
Type: IMPLANTABLE DEVICE | Site: CAROTID | Status: FUNCTIONAL
Brand: FLOSEAL HEMOSTATIC MATRIX

## 2022-09-13 RX ORDER — HEPARIN SODIUM 1000 [USP'U]/ML
INJECTION, SOLUTION INTRAVENOUS; SUBCUTANEOUS AS NEEDED
Status: DISCONTINUED | OUTPATIENT
Start: 2022-09-13 | End: 2022-09-13 | Stop reason: SURG

## 2022-09-13 RX ORDER — LIDOCAINE HYDROCHLORIDE AND EPINEPHRINE 5; 5 MG/ML; UG/ML
INJECTION, SOLUTION INFILTRATION; PERINEURAL AS NEEDED
Status: DISCONTINUED | OUTPATIENT
Start: 2022-09-13 | End: 2022-09-13 | Stop reason: HOSPADM

## 2022-09-13 RX ORDER — SODIUM CHLORIDE 9 MG/ML
9 INJECTION, SOLUTION INTRAVENOUS CONTINUOUS
Status: DISCONTINUED | OUTPATIENT
Start: 2022-09-13 | End: 2022-09-14 | Stop reason: HOSPADM

## 2022-09-13 RX ORDER — MIDAZOLAM HYDROCHLORIDE 1 MG/ML
1 INJECTION INTRAMUSCULAR; INTRAVENOUS
Status: DISCONTINUED | OUTPATIENT
Start: 2022-09-13 | End: 2022-09-13 | Stop reason: HOSPADM

## 2022-09-13 RX ORDER — NICARDIPINE HYDROCHLORIDE 2.5 MG/ML
INJECTION INTRAVENOUS AS NEEDED
Status: DISCONTINUED | OUTPATIENT
Start: 2022-09-13 | End: 2022-09-13 | Stop reason: SURG

## 2022-09-13 RX ORDER — HYDROMORPHONE HYDROCHLORIDE 1 MG/ML
0.5 INJECTION, SOLUTION INTRAMUSCULAR; INTRAVENOUS; SUBCUTANEOUS
Status: DISCONTINUED | OUTPATIENT
Start: 2022-09-13 | End: 2022-09-13 | Stop reason: HOSPADM

## 2022-09-13 RX ORDER — PANTOPRAZOLE SODIUM 40 MG/1
40 TABLET, DELAYED RELEASE ORAL
Status: DISCONTINUED | OUTPATIENT
Start: 2022-09-13 | End: 2022-09-14 | Stop reason: HOSPADM

## 2022-09-13 RX ORDER — ESMOLOL HYDROCHLORIDE 10 MG/ML
INJECTION INTRAVENOUS AS NEEDED
Status: DISCONTINUED | OUTPATIENT
Start: 2022-09-13 | End: 2022-09-13 | Stop reason: SURG

## 2022-09-13 RX ORDER — FLUOXETINE HYDROCHLORIDE 20 MG/1
40 CAPSULE ORAL DAILY
Status: DISCONTINUED | OUTPATIENT
Start: 2022-09-14 | End: 2022-09-14 | Stop reason: HOSPADM

## 2022-09-13 RX ORDER — SODIUM CHLORIDE 0.9 % (FLUSH) 0.9 %
3-10 SYRINGE (ML) INJECTION AS NEEDED
Status: DISCONTINUED | OUTPATIENT
Start: 2022-09-13 | End: 2022-09-13 | Stop reason: HOSPADM

## 2022-09-13 RX ORDER — CLOPIDOGREL BISULFATE 75 MG/1
75 TABLET ORAL DAILY
Status: DISCONTINUED | OUTPATIENT
Start: 2022-09-14 | End: 2022-09-14 | Stop reason: HOSPADM

## 2022-09-13 RX ORDER — LIDOCAINE HYDROCHLORIDE 10 MG/ML
0.5 INJECTION, SOLUTION EPIDURAL; INFILTRATION; INTRACAUDAL; PERINEURAL ONCE AS NEEDED
Status: COMPLETED | OUTPATIENT
Start: 2022-09-13 | End: 2022-09-13

## 2022-09-13 RX ORDER — FAMOTIDINE 20 MG/1
20 TABLET, FILM COATED ORAL ONCE
Status: COMPLETED | OUTPATIENT
Start: 2022-09-13 | End: 2022-09-13

## 2022-09-13 RX ORDER — PROMETHAZINE HYDROCHLORIDE 25 MG/1
25 SUPPOSITORY RECTAL ONCE AS NEEDED
Status: DISCONTINUED | OUTPATIENT
Start: 2022-09-13 | End: 2022-09-13 | Stop reason: HOSPADM

## 2022-09-13 RX ORDER — GLIPIZIDE 5 MG/1
2.5 TABLET ORAL DAILY
Status: DISCONTINUED | OUTPATIENT
Start: 2022-09-13 | End: 2022-09-13

## 2022-09-13 RX ORDER — HYDRALAZINE HYDROCHLORIDE 20 MG/ML
5 INJECTION INTRAMUSCULAR; INTRAVENOUS
Status: DISCONTINUED | OUTPATIENT
Start: 2022-09-13 | End: 2022-09-13 | Stop reason: HOSPADM

## 2022-09-13 RX ORDER — ASPIRIN 81 MG/1
81 TABLET ORAL DAILY
Status: DISCONTINUED | OUTPATIENT
Start: 2022-09-13 | End: 2022-09-14 | Stop reason: HOSPADM

## 2022-09-13 RX ORDER — NICOTINE POLACRILEX 4 MG
15 LOZENGE BUCCAL
Status: DISCONTINUED | OUTPATIENT
Start: 2022-09-13 | End: 2022-09-14 | Stop reason: HOSPADM

## 2022-09-13 RX ORDER — SODIUM CHLORIDE 0.9 % (FLUSH) 0.9 %
3 SYRINGE (ML) INJECTION EVERY 12 HOURS SCHEDULED
Status: DISCONTINUED | OUTPATIENT
Start: 2022-09-13 | End: 2022-09-13 | Stop reason: HOSPADM

## 2022-09-13 RX ORDER — ONDANSETRON 2 MG/ML
INJECTION INTRAMUSCULAR; INTRAVENOUS AS NEEDED
Status: DISCONTINUED | OUTPATIENT
Start: 2022-09-13 | End: 2022-09-13 | Stop reason: SURG

## 2022-09-13 RX ORDER — SODIUM CHLORIDE 0.9 % (FLUSH) 0.9 %
10 SYRINGE (ML) INJECTION EVERY 12 HOURS SCHEDULED
Status: DISCONTINUED | OUTPATIENT
Start: 2022-09-13 | End: 2022-09-14 | Stop reason: HOSPADM

## 2022-09-13 RX ORDER — EPHEDRINE SULFATE 50 MG/ML
INJECTION, SOLUTION INTRAVENOUS AS NEEDED
Status: DISCONTINUED | OUTPATIENT
Start: 2022-09-13 | End: 2022-09-13 | Stop reason: SURG

## 2022-09-13 RX ORDER — FENTANYL CITRATE 50 UG/ML
INJECTION, SOLUTION INTRAMUSCULAR; INTRAVENOUS AS NEEDED
Status: DISCONTINUED | OUTPATIENT
Start: 2022-09-13 | End: 2022-09-13 | Stop reason: SURG

## 2022-09-13 RX ORDER — INSULIN LISPRO 100 [IU]/ML
0-7 INJECTION, SOLUTION INTRAVENOUS; SUBCUTANEOUS
Status: DISCONTINUED | OUTPATIENT
Start: 2022-09-13 | End: 2022-09-14 | Stop reason: HOSPADM

## 2022-09-13 RX ORDER — NALOXONE HCL 0.4 MG/ML
0.4 VIAL (ML) INJECTION AS NEEDED
Status: DISCONTINUED | OUTPATIENT
Start: 2022-09-13 | End: 2022-09-13 | Stop reason: HOSPADM

## 2022-09-13 RX ORDER — BUPIVACAINE HCL/0.9 % NACL/PF 0.125 %
PLASTIC BAG, INJECTION (ML) EPIDURAL AS NEEDED
Status: DISCONTINUED | OUTPATIENT
Start: 2022-09-13 | End: 2022-09-13 | Stop reason: SURG

## 2022-09-13 RX ORDER — SODIUM CHLORIDE 9 MG/ML
INJECTION, SOLUTION INTRAVENOUS AS NEEDED
Status: DISCONTINUED | OUTPATIENT
Start: 2022-09-13 | End: 2022-09-13 | Stop reason: HOSPADM

## 2022-09-13 RX ORDER — METOPROLOL SUCCINATE 50 MG/1
50 TABLET, EXTENDED RELEASE ORAL DAILY
Status: DISCONTINUED | OUTPATIENT
Start: 2022-09-14 | End: 2022-09-14 | Stop reason: HOSPADM

## 2022-09-13 RX ORDER — CEFAZOLIN SODIUM 2 G/100ML
2 INJECTION, SOLUTION INTRAVENOUS EVERY 8 HOURS
Status: COMPLETED | OUTPATIENT
Start: 2022-09-13 | End: 2022-09-14

## 2022-09-13 RX ORDER — SODIUM CHLORIDE 0.9 % (FLUSH) 0.9 %
10 SYRINGE (ML) INJECTION AS NEEDED
Status: DISCONTINUED | OUTPATIENT
Start: 2022-09-13 | End: 2022-09-14 | Stop reason: HOSPADM

## 2022-09-13 RX ORDER — LIDOCAINE HYDROCHLORIDE 10 MG/ML
INJECTION, SOLUTION EPIDURAL; INFILTRATION; INTRACAUDAL; PERINEURAL AS NEEDED
Status: DISCONTINUED | OUTPATIENT
Start: 2022-09-13 | End: 2022-09-13 | Stop reason: SURG

## 2022-09-13 RX ORDER — HYDROCODONE BITARTRATE AND ACETAMINOPHEN 5; 325 MG/1; MG/1
1 TABLET ORAL EVERY 4 HOURS PRN
Status: DISCONTINUED | OUTPATIENT
Start: 2022-09-13 | End: 2022-09-14 | Stop reason: HOSPADM

## 2022-09-13 RX ORDER — OXYBUTYNIN CHLORIDE 5 MG/1
5 TABLET ORAL 2 TIMES DAILY
Status: DISCONTINUED | OUTPATIENT
Start: 2022-09-13 | End: 2022-09-14 | Stop reason: HOSPADM

## 2022-09-13 RX ORDER — ATORVASTATIN CALCIUM 40 MG/1
80 TABLET, FILM COATED ORAL DAILY
Status: DISCONTINUED | OUTPATIENT
Start: 2022-09-13 | End: 2022-09-14 | Stop reason: HOSPADM

## 2022-09-13 RX ORDER — AMLODIPINE BESYLATE 10 MG/1
10 TABLET ORAL DAILY
Status: DISCONTINUED | OUTPATIENT
Start: 2022-09-13 | End: 2022-09-14 | Stop reason: HOSPADM

## 2022-09-13 RX ORDER — PROMETHAZINE HYDROCHLORIDE 25 MG/1
25 TABLET ORAL ONCE AS NEEDED
Status: DISCONTINUED | OUTPATIENT
Start: 2022-09-13 | End: 2022-09-13 | Stop reason: HOSPADM

## 2022-09-13 RX ORDER — ROCURONIUM BROMIDE 10 MG/ML
INJECTION, SOLUTION INTRAVENOUS AS NEEDED
Status: DISCONTINUED | OUTPATIENT
Start: 2022-09-13 | End: 2022-09-13 | Stop reason: SURG

## 2022-09-13 RX ORDER — HYDROCODONE BITARTRATE AND ACETAMINOPHEN 5; 325 MG/1; MG/1
1 TABLET ORAL ONCE AS NEEDED
Status: DISCONTINUED | OUTPATIENT
Start: 2022-09-13 | End: 2022-09-13 | Stop reason: HOSPADM

## 2022-09-13 RX ORDER — CEFAZOLIN SODIUM 2 G/100ML
2 INJECTION, SOLUTION INTRAVENOUS ONCE
Status: COMPLETED | OUTPATIENT
Start: 2022-09-13 | End: 2022-09-13

## 2022-09-13 RX ORDER — DEXTROSE MONOHYDRATE 25 G/50ML
25 INJECTION, SOLUTION INTRAVENOUS
Status: DISCONTINUED | OUTPATIENT
Start: 2022-09-13 | End: 2022-09-14 | Stop reason: HOSPADM

## 2022-09-13 RX ORDER — LABETALOL HYDROCHLORIDE 5 MG/ML
5 INJECTION, SOLUTION INTRAVENOUS
Status: DISCONTINUED | OUTPATIENT
Start: 2022-09-13 | End: 2022-09-13 | Stop reason: HOSPADM

## 2022-09-13 RX ORDER — PROPOFOL 10 MG/ML
VIAL (ML) INTRAVENOUS AS NEEDED
Status: DISCONTINUED | OUTPATIENT
Start: 2022-09-13 | End: 2022-09-13 | Stop reason: SURG

## 2022-09-13 RX ORDER — DROPERIDOL 2.5 MG/ML
0.62 INJECTION, SOLUTION INTRAMUSCULAR; INTRAVENOUS ONCE AS NEEDED
Status: DISCONTINUED | OUTPATIENT
Start: 2022-09-13 | End: 2022-09-13 | Stop reason: HOSPADM

## 2022-09-13 RX ORDER — ONDANSETRON 2 MG/ML
4 INJECTION INTRAMUSCULAR; INTRAVENOUS ONCE AS NEEDED
Status: DISCONTINUED | OUTPATIENT
Start: 2022-09-13 | End: 2022-09-13 | Stop reason: HOSPADM

## 2022-09-13 RX ORDER — LOSARTAN POTASSIUM 50 MG/1
100 TABLET ORAL DAILY
Status: DISCONTINUED | OUTPATIENT
Start: 2022-09-13 | End: 2022-09-14 | Stop reason: HOSPADM

## 2022-09-13 RX ORDER — DROPERIDOL 2.5 MG/ML
0.62 INJECTION, SOLUTION INTRAMUSCULAR; INTRAVENOUS
Status: DISCONTINUED | OUTPATIENT
Start: 2022-09-13 | End: 2022-09-13 | Stop reason: HOSPADM

## 2022-09-13 RX ORDER — FENTANYL CITRATE 50 UG/ML
50 INJECTION, SOLUTION INTRAMUSCULAR; INTRAVENOUS
Status: DISCONTINUED | OUTPATIENT
Start: 2022-09-13 | End: 2022-09-13 | Stop reason: HOSPADM

## 2022-09-13 RX ORDER — IPRATROPIUM BROMIDE AND ALBUTEROL SULFATE 2.5; .5 MG/3ML; MG/3ML
3 SOLUTION RESPIRATORY (INHALATION) ONCE AS NEEDED
Status: DISCONTINUED | OUTPATIENT
Start: 2022-09-13 | End: 2022-09-13 | Stop reason: HOSPADM

## 2022-09-13 RX ADMIN — ROCURONIUM BROMIDE 10 MG: 50 INJECTION, SOLUTION INTRAVENOUS at 09:28

## 2022-09-13 RX ADMIN — CEFAZOLIN SODIUM 2 G: 2 INJECTION, SOLUTION INTRAVENOUS at 15:54

## 2022-09-13 RX ADMIN — FENTANYL CITRATE 100 MCG: 50 INJECTION, SOLUTION INTRAMUSCULAR; INTRAVENOUS at 08:13

## 2022-09-13 RX ADMIN — LIDOCAINE HYDROCHLORIDE 50 MG: 10 INJECTION, SOLUTION EPIDURAL; INFILTRATION; INTRACAUDAL; PERINEURAL at 07:40

## 2022-09-13 RX ADMIN — NICARDIPINE HYDROCHLORIDE 0.3 MG: 25 INJECTION INTRAVENOUS at 09:37

## 2022-09-13 RX ADMIN — NICARDIPINE HYDROCHLORIDE 5 MG/HR: 0.1 INJECTION, SOLUTION INTRAVENOUS at 09:36

## 2022-09-13 RX ADMIN — PHENYLEPHRINE HYDROCHLORIDE 0.5 MCG/KG/MIN: 10 INJECTION INTRAVENOUS at 08:28

## 2022-09-13 RX ADMIN — EPHEDRINE SULFATE 2.5 MG: 50 INJECTION INTRAVENOUS at 08:33

## 2022-09-13 RX ADMIN — HEPARIN SODIUM 3000 UNITS: 1000 INJECTION, SOLUTION INTRAVENOUS; SUBCUTANEOUS at 08:34

## 2022-09-13 RX ADMIN — SODIUM CHLORIDE 9 ML/HR: 9 INJECTION, SOLUTION INTRAVENOUS at 06:51

## 2022-09-13 RX ADMIN — ROCURONIUM BROMIDE 50 MG: 50 INJECTION, SOLUTION INTRAVENOUS at 07:40

## 2022-09-13 RX ADMIN — ROCURONIUM BROMIDE 20 MG: 50 INJECTION, SOLUTION INTRAVENOUS at 08:50

## 2022-09-13 RX ADMIN — SODIUM CHLORIDE 9 ML/HR: 9 INJECTION, SOLUTION INTRAVENOUS at 06:50

## 2022-09-13 RX ADMIN — ROCURONIUM BROMIDE 20 MG: 50 INJECTION, SOLUTION INTRAVENOUS at 08:13

## 2022-09-13 RX ADMIN — Medication 50 MCG: at 09:01

## 2022-09-13 RX ADMIN — ESMOLOL HYDROCHLORIDE 70 MG: 10 INJECTION, SOLUTION INTRAVENOUS at 07:40

## 2022-09-13 RX ADMIN — LIDOCAINE HYDROCHLORIDE 0.5 ML: 10 INJECTION, SOLUTION EPIDURAL; INFILTRATION; INTRACAUDAL; PERINEURAL at 06:50

## 2022-09-13 RX ADMIN — SUGAMMADEX 200 MG: 100 INJECTION, SOLUTION INTRAVENOUS at 10:09

## 2022-09-13 RX ADMIN — Medication 10 ML: at 22:00

## 2022-09-13 RX ADMIN — ONDANSETRON 4 MG: 2 INJECTION INTRAMUSCULAR; INTRAVENOUS at 09:39

## 2022-09-13 RX ADMIN — FAMOTIDINE 20 MG: 20 TABLET ORAL at 06:52

## 2022-09-13 RX ADMIN — OXYBUTYNIN CHLORIDE 5 MG: 5 TABLET ORAL at 22:00

## 2022-09-13 RX ADMIN — CEFAZOLIN SODIUM 2 G: 2 INJECTION, SOLUTION INTRAVENOUS at 07:45

## 2022-09-13 RX ADMIN — PROPOFOL 150 MG: 10 INJECTION, EMULSION INTRAVENOUS at 07:40

## 2022-09-13 RX ADMIN — EPHEDRINE SULFATE 5 MG: 50 INJECTION INTRAVENOUS at 07:49

## 2022-09-13 RX ADMIN — PANTOPRAZOLE SODIUM 40 MG: 40 TABLET, DELAYED RELEASE ORAL at 15:54

## 2022-09-13 NOTE — H&P
Markel Ku MD    Physician   Specialty:  Neurosurgery   H&P      Signed   Encounter Date:  9/2/2022          Related encounter: Office Visit from 9/2/2022 in [facility] [place] with Markel Ku MD           Signed              Show:Clear all  []Manual[]Template[x]Copied    Added by:  [x]Markel Ku MD      []Hover for details    Subjective      Chief Complaint: Symptomatic left internal carotid artery stenosis     Patient ID: Joie Garnica is a 62 y.o. female seen for consultation today at the request of  Rolando Ralph MD     History of Present Illness     This is a 62-year-old woman who underwent a diagnostic cerebral angiogram with my partner, Dr. Ralph, to evaluate her carotids for some anterior circulation TIAs that she has been having.  She has had several episodes of speech arrest in the last few months.  He did an angiogram and found that she had a high-grade, bulky, calcific plaque at the origin of the left internal carotid artery which was causing stenosis.  This is the presumptive culprit lesion for her left hemispheric TIAs.  She was referred to my clinic to discuss surgical treatment via carotid endarterectomy.     Her medical comorbidities are significant for moderate obesity, dyslipidemia, and hypertension.  She is a former smoker.     The following portions of the patient's history were reviewed and updated as appropriate: allergies, current medications, past family history, past medical history, past social history, past surgical history and problem list.     Family history:         Family History   Problem Relation Age of Onset   • No Known Problems Mother     • Heart attack Father           Social history:   Social History            Socioeconomic History   • Marital status:    Tobacco Use   • Smoking status: Never Smoker   • Smokeless tobacco: Never Used   Substance and Sexual Activity   • Alcohol  "use: Yes       Comment: rarely   • Drug use: No   • Sexual activity: Defer         Review of Systems     Objective   Resp. rate 16, height 157.5 cm (62\"), weight 74.4 kg (164 lb).  Body mass index is 30 kg/m².     Physical Exam  Constitutional:       General: She is not in acute distress.     Appearance: She is well-developed. She is not diaphoretic.   HENT:      Head: Normocephalic and atraumatic.   Pulmonary:      Effort: Pulmonary effort is normal.   Skin:     General: Skin is warm and dry.   Neurological:      Mental Status: She is alert and oriented to person, place, and time.      Cranial Nerves: No cranial nerve deficit.      Comments: Speech production is fluent with no paraphasic errors.  Naming and repetition are intact.    Cranial nerves II through XII are grossly intact.    She has no pronator drift, and finger-to-nose testing is performed without bradykinesia or dysmetria.          Assessment & Plan      Independent Review of Radiographic Studies:       Available for my review is a cerebral angiogram that was performed by Dr. Ralph on 8/26/2022.  There is a high-grade, greater than 70% stenosis at the origin of the left internal carotid artery with a bulky, calcific plaque.  T     Medical Decision Making:       This is a 62-year-old woman with symptomatic left internal carotid artery stenosis.  She does not have any high risk factors for carotid endarterectomy.       Informed consent for a carotid endarterectomy was obtained from the patient. She acknowledges a risk of stroke, death, pain, paralysis, coma, blindness, permanent neurologic disability, bleeding, infection, recurrent laryngeal nerve injury, hoarseness, dysphagia, hypoglossal nerve injury, recurrent stenosis, failure of benefit of the operation, or need for additional procedures.  All questions were answered.  No guarantees were given or implied.  The patient elects to proceed with surgery.     We will schedule her on an elective basis for " her left carotid endarterectomy.  She should remain on her aspirin and Plavix until that time.     There are no diagnoses linked to this encounter.     No follow-ups on file.              This document signed by NEYDA Ku MD September 2, 2022 13:31 EDT                           Routing History                    [facility] Pre-op    Full history and physical note from office is up to date.     There were no vitals taken for this visit.  Patient denies allergy to contrast dye or latex  IMM:  Influenza: Yes  Pneumococcal: Yes  Tetanus: Up-to-date  Lungs: Clear to auscultation bilateral bases  Cardiovascular: S1-S2 without rubs murmurs gallops  Abdomen: Soft, nontender, bowel sounds throughout    LAB Results:  Lab Results   Component Value Date    WBC 7.26 09/12/2022    HGB 12.2 09/12/2022    HCT 36.8 09/12/2022    MCV 87.2 09/12/2022     09/12/2022    NEUTROABS 3.60 09/12/2022    GLUCOSE 183 (H) 09/12/2022    BUN 26 (H) 09/12/2022    CREATININE 1.54 (H) 09/12/2022     09/12/2022    K 3.5 09/12/2022     09/12/2022    CO2 26.0 09/12/2022    MG 1.9 08/24/2022    CALCIUM 9.3 09/12/2022    ALBUMIN 4.10 08/25/2022    AST 25 08/25/2022    ALT 16 08/25/2022    BILITOT 0.4 08/25/2022    PTT 27.4 08/23/2022    INR 0.96 09/12/2022       Cancer Staging (if applicable)  Cancer Patient: __ yes __no __unknown__N/A; If yes, clinical stage T:__ N:__M:__, stage group or __N/A  Impression: TIA  Carotid artery stenosis  History of COVID  Type 2 diabetes mellitus  Hypertension  Gastroesophageal reflux disease without esophagitis  Anxiety associated with depression  Plan: Left carotid endarterectomy with EEG monitoring, left  YELENA Newby   09/13/22   6:40 AM EDT

## 2022-09-13 NOTE — ANESTHESIA PREPROCEDURE EVALUATION
Anesthesia Evaluation     Patient summary reviewed and Nursing notes reviewed                Airway   Mallampati: II  TM distance: >3 FB  Neck ROM: full  No difficulty expected  Dental - normal exam     Pulmonary - negative pulmonary ROS and normal exam   Cardiovascular - normal exam    (+) hypertension, hyperlipidemia,  carotid artery disease left carotid    ROS comment:   Echo 8/22: normal EF, no significant valve disease    Neuro/Psych  (+) CVA (no residual),    GI/Hepatic/Renal/Endo    (+)  GERD,  diabetes mellitus,     Musculoskeletal (-) negative ROS    Abdominal  - normal exam    Bowel sounds: normal.   Substance History - negative use     OB/GYN negative ob/gyn ROS         Other                      Anesthesia Plan    ASA 3     general     (A line)  intravenous induction     Anesthetic plan, risks, benefits, and alternatives have been provided, discussed and informed consent has been obtained with: patient.    Plan discussed with CRNA.        CODE STATUS:

## 2022-09-13 NOTE — ANESTHESIA POSTPROCEDURE EVALUATION
Patient: Joie Garnica    Procedure Summary     Date: 09/13/22 Room / Location:  [facility] [address] [place] [address] [facility]    Anesthesia Start: 0735 Anesthesia Stop:     Procedure: LEFT CAROTID ENDARTERECTOMY WITH EEG (Left Neck) Diagnosis:       Left carotid stenosis      (Left carotid stenosis [I65.22])    Surgeons: Markel Ku MD Provider: Josemanuel Briones MD    Anesthesia Type: general ASA Status: 3          Anesthesia Type: general    Vitals  Vitals Value Taken Time   BP     Temp     Pulse     Resp     SpO2 90 % 09/13/22 1030   Vitals shown include unvalidated device data.        Post Anesthesia Care and Evaluation    Patient location during evaluation: PACU  Patient participation: complete - patient participated  Level of consciousness: responsive to verbal stimuli  Pain score: 0  Pain management: adequate    Airway patency: patent  Anesthetic complications: No anesthetic complications  PONV Status: none  Cardiovascular status: hemodynamically stable and acceptable  Respiratory status: nonlabored ventilation, acceptable, nasal cannula and spontaneous ventilation  Hydration status: acceptable    Comments: VSS  No anesthesia care post op

## 2022-09-13 NOTE — ANESTHESIA PROCEDURE NOTES
Airway  Urgency: elective    Date/Time: 9/13/2022 7:42 AM  Airway not difficult    General Information and Staff    Patient location during procedure: OR  CRNA/CAA: Valdez Curiel CRNA    Indications and Patient Condition  Indications for airway management: airway protection    Preoxygenated: yes  MILS not maintained throughout  Mask difficulty assessment: 1 - vent by mask    Final Airway Details  Final airway type: endotracheal airway      Successful airway: ETT  Cuffed: yes   Successful intubation technique: direct laryngoscopy  Endotracheal tube insertion site: oral  Blade: Phillip  Blade size: 3  ETT size (mm): 7.0  Cormack-Lehane Classification: grade I - full view of glottis  Placement verified by: chest auscultation and capnometry   Measured from: lips  ETT/EBT  to lips (cm): 20  Number of attempts at approach: 1  Assessment: lips, teeth, and gum same as pre-op and atraumatic intubation    Additional Comments  Negative epigastric sounds, Breath sound equal bilaterally with symmetric chest rise and fall    LTA utilized on vocal cords prior to intubation

## 2022-09-13 NOTE — PROGRESS NOTES
Pulmonary/Critical Care History and Physical Exam     LOS: 0 days     Chief Complaint: Admitted for elective left CEA      Subjective     HPI:   Ms. Joie Garnica, is a 62 y.o. female with PMH of DM (HbA1c 6.6), hypertension, hyperlipidemia, known extracranial cerebrovascular disease with bilateral carotid artery atherosclerosis, CVA with no residual 7 years ago, GERD, COVID infection 8/16/2022 not requiring therapy, and anxiety/depression.  Patient has been followed for years by Dr. Ralph in the neuro intervention clinic for her bilateral carotid atherosclerosis, but has not been seen for the last 2 to 3 years due to COVID.  She remained asymptomatic until a recent hospitalization at [facility] 8/23/2022 through 8/26/2022 after presenting with dizziness and slurred speech which was felt related to progression of her left ICA atherosclerosis with greater than 70% stenosis.  During that hospital stay patient underwent selective bilateral common carotid artery catheterization with diagnostic bilateral extracranial and intracranial carotid angiography.  This confirmed a greater than 70% stenosis of the origin of the left ICA which explained her left hemispheric anterior circulation TIAs manifesting as dysarthria and word finding difficulties.  She was sent home on aspirin, Plavix, and high-dose statin and seen as an outpatient by Dr. Ku 9/2/2022.  It was recommended she undergo left CEA and she agreed.  Today (9/13/2022) was electively admitted and underwent successful left carotid endarterectomy with EEG monitoring.  No early complication and was transferred to the ICU for monitoring and blood pressure management.    Interval history: Hemodynamics good with blood pressure 132/49 and pulse rate only 62 and regular without arrhythmias.  No respiratory distress and O2 sats 97% on room air.  Has the usual amount of postoperative neck pain but dressings clean and dry and BETZAIDA drain with only a small amount of draining  blood.  No chest pain, cough, purulent sputum production, hemoptysis, or pleuritic pain.  No nausea, vomiting, diarrhea, melena, hematochezia.  In reviewing labs it is noted that her creatinine is increased 1.01 on 8/26/2022, to 1.54 today 9/13/2022).  Denies any difficulty voiding, urgency, frequency, or hematuria.  Does occasionally note bladder spasm.  When recently hospitalized, her creatinine was 1.36 on 8/23/2022 but it dropped to 0.97 by 8/25/2022.  She is on no nephrotoxic drugs at home.      Past Medical History:   Diagnosis Date   • Anxiety and depression    • Diabetes mellitus (HCC)    • GERD (gastroesophageal reflux disease)    • Hypertension    • Stroke (HCC)      .   Extracranial cerebrovascular disease with bilateral carotid plaque left greater than right    .    Hyperlipidemia    .   COVID infection 8/16/2022 not requiring hospitalization today    .   JUDY 8/2022 resolved    Past Surgical History:   Procedure Laterality Date   • APPENDECTOMY     • COLONOSCOPY     • INTERVENTIONAL RADIOLOGY PROCEDURE Bilateral 08/26/2022    Procedure: CAROTID CEREBRAL ANGIOGRAM BILATERAL;  Surgeon: Rolando Ralph MD;  Location: [facility] INVASIVE LOCATION;  Service: Interventional Radiology;  Laterality: Bilateral;       Current Outpatient Medications on File Prior to Encounter   Medication Sig Dispense Refill   • amLODIPine (NORVASC) 10 MG tablet Take 10 mg by mouth Daily.     • aspirin (aspirin) 81 MG EC tablet Take 1 tablet by mouth Daily. May obtain over the counter     • atorvastatin (Lipitor) 80 MG tablet Take 1 tablet by mouth Daily. 30 tablet 0   • chlorhexidine (HIBICLENS) 4 % external liquid Shower each day with solution for 5 days beginning 5 days before surgery. 120 mL 0   • clopidogrel (PLAVIX) 75 MG tablet Take 1 tablet by mouth Daily. 30 tablet 0   • FLUoxetine (PROzac) 40 MG capsule Take 40 mg by mouth Daily.     • glipizide (GLUCOTROL) 5 MG tablet Take 0.5 tablets by mouth Daily.     • losartan  (COZAAR) 100 MG tablet Take 1 tablet by mouth Daily.     • meclizine (ANTIVERT) 25 MG tablet Take 1 tablet by mouth 3 (Three) Times a Day As Needed for Dizziness. Already has a prescription at home for this from her PCP     • omeprazole (priLOSEC) 20 MG capsule Take 1 capsule by mouth Daily.     • oxybutynin (DITROPAN) 5 MG tablet Take 5 mg by mouth 2 (Two) Times a Day.     • SITagliptin (JANUVIA) 100 MG tablet Take 1 tablet by mouth Daily.     • [DISCONTINUED] metoprolol tartrate (LOPRESSOR) 25 MG tablet Take 25 mg by mouth 2 (Two) Times a Day.     • [DISCONTINUED] Paxlovid, 300/100, 20 x 150 MG & 10 x 100MG tablet therapy pack tablet TAKE 3 TABLETS TOGETHER ( MG NIRMATRELVIR TABLETS AND  MG RITONAVIR TABLET) BY MOUTH TWICE DAILY FOR 5 DAYS.       No Known Allergies    Family History   Problem Relation Age of Onset   • No Known Problems Mother    • Heart attack Father        Social History     Socioeconomic History   • Marital status:    Tobacco Use   • Smoking status: Never Smoker   • Smokeless tobacco: Never Used   Vaping Use   • Vaping Use: Never used   Substance and Sexual Activity   • Alcohol use: Yes     Comment: rarely   • Drug use: No   • Sexual activity: Defer           Review of Systems:    All systems were reviewed and negative except as noted in subjective.      Objective     Vital Signs  Temp:  [97.8 °F (36.6 °C)-98.8 °F (37.1 °C)] 98.8 °F (37.1 °C)  Heart Rate:  [57-70] 57  Resp:  [16-18] 16  BP: (109-182)/(60-84) 120/64  Arterial Line BP: (112-132)/(40-59) 122/45    Physical Exam:     General Appearance:    Alert, cooperative, in no acute distress   Head:    Normocephalic, without obvious abnormality, atraumatic   Eyes:            Lids and lashes normal, conjunctivae and sclerae normal, no   icterus, no pallor, corneas clear, PERRLA   ENMT:   Ears appear intact with no abnormalities noted      No oral lesions, no thrush, oral mucosa moist      No adenopathy, supple, trachea  midline, no thyromegaly, no   carotid bruit, no JVD  Left carotid incision covered with clean and dry dressing.  BETZAIDA drain in place with blood in the bulb       Lungs/resp:     Normal effort, symmetric chest rise, no crepitus, clear to      auscultation bilaterally, no chest wall tenderness, resonant to percussion throughout.  No wheezes, rales, rhonchi                  Heart/CV:   RRR and normal rate, normal S1 and S2, no            murmur, no gallop, no rub, no click   Abdomen/GI:     Normal bowel sounds, no masses, no organomegaly, soft        non-tender, non-distended, no guarding, no rebound                tenderness   G/U:     Deferred   Extremities/MSK:   No clubbing, cyanosis or edema.  No deformities.  Right radial arterial line in place   Pulses:   Pulses palpable and equal bilaterally   Skin:   No bleeding, bruising or rash   Hem/Lymph:   No cervical or supraclavicular adenopathy.    Neurologic:   Moves all extremities with no obvious focal motor deficit.  Cranial nerves 2 - 12 grossly intact            Psychiatric:  Normal mood and affect, oriented x 3.      Results Review:     I reviewed the patient's new clinical results.   Results from last 7 days   Lab Units 09/12/22  0837   SODIUM mmol/L 141   POTASSIUM mmol/L 3.5   CHLORIDE mmol/L 103   CO2 mmol/L 26.0   BUN mg/dL 26*   CREATININE mg/dL 1.54*   CALCIUM mg/dL 9.3   GLUCOSE mg/dL 183*     Results from last 7 days   Lab Units 09/12/22  0837   WBC 10*3/mm3 7.26   HEMOGLOBIN g/dL 12.2   HEMATOCRIT % 36.8   PLATELETS 10*3/mm3 235           I reviewed the patient's new imaging including images and reports.      niCARdipine, 5-15 mg/hr  sodium chloride, 9 mL/hr, Last Rate: 9 mL/hr (09/13/22 0735)        Assessment & Plan       Bilateral symptomatic carotid stenosis left greater than right    TIA involving left internal carotid artery 8/23/2022    JUDY (acute kidney injury) (HCC)    T2DM with HbA1c with HbA1c 6.6 (HCC)    HTN (hypertension)     Hyperlipidemia    COVID-19 infection 8/16/2022 not requiring therapy    Plan:  Accu-Cheks with meals and bedtime  Ulcer (Protonix) and DVT (SCDs) prophylaxis  Hold oral antidiabetic medicines and treat with SSI while here  Follow-up renal function in a.m.      Time: Critical care 25 min      30 minutes of critical care provided. This time excludes other billable procedures. Time does include preparation of documents, medical consultations, review of old records, and direct bedside care.     Electronically signed by JONA Courtney, 09/13/22, 7:32 AM EDT.    Electronically signed by Rc Rodríguez MD, 09/13/22, 5:31 PM EDT.   Pulmonary / Critical care medicine

## 2022-09-13 NOTE — ANESTHESIA PROCEDURE NOTES
Arterial Line      Start time: 9/13/2022 6:45 AM  Stop Time:9/13/2022 6:52 AM       Line placed for hemodynamic monitoring.  Performed By   HARRIET/CAA: Valdez Curiel CRNA  Preanesthetic Checklist  Completed: patient identified, IV checked, site marked, risks and benefits discussed, surgical consent, monitors and equipment checked, pre-op evaluation and timeout performed  Arterial Line Prep   Sterile Tech: cap, gloves and sterile barriers  Prep: ChloraPrep  Patient monitoring: EKG, continuous pulse oximetry and blood pressure monitoring  Arterial Line Procedure   Laterality:right  Location:  radial artery  Catheter size: 20 G   Guidance: ultrasound guided  PROCEDURE NOTE/ULTRASOUND INTERPRETATION.  Using ultrasound guidance the potential vascular sites for insertion of the catheter were visualized to determine the patency of the vessel to be used for vascular access.  After selecting the appropriate site for insertion, the needle was visualized under ultrasound being inserted into the radial artery, followed by ultrasound confirmation of wire and catheter placement. There were no abnormalities seen on ultrasound; an image was taken; and the patient tolerated the procedure with no complications.   Number of attempts: 1  Successful placement: yes  Post Assessment   Dressing Type: wrist guard applied, secured with tape, occlusive dressing applied, line sutured and biopatch applied.   Complications no  Circ/Move/Sens Assessment: normal and unchanged.   Patient Tolerance: patient tolerated the procedure well with no apparent complications

## 2022-09-13 NOTE — OP NOTE
Pre-operative diagnosis: Left internal carotid artery stenosis  Post-operative diagnosis: Same    Procedures performed:  1.  Left carotid endarterectomy with EEG monitoring    Estimated Blood Loss: <100 mL    Indication for procedure: This is a 62-year-old woman who was found to have severe left internal carotid artery stenosis by angiogram.  She did not have any high risk features for carotid endarterectomy and her lesion was heavily calcific.  After consulting with Dr. Ralph, the determination that she would be better served by an endarterectomy was made.  She is taken to the operating room today for her procedure.    Informed consent for a carotid endarterectomy was obtained from the patient. She acknowledges a risk of stroke, death, pain, paralysis, coma, blindness, permanent neurologic disability, bleeding, infection, recurrent laryngeal nerve injury, hoarseness, dysphagia, hypoglossal nerve injury, recurrent stenosis, failure of benefit of the operation, or need for additional procedures.  All questions were answered.  No guarantees were given or implied.  The patient elects to proceed with surgery.      Procedure in detail: The patient was identified in the pre-operative holding area and brought to the operating suite where she underwent the uneventful induction of general, endotracheal anesthesia. Venodynes were placed by the nursing staff.  The patient's neck was gently extended and rotated exposing the left anterior triangle of the neck.  An obliquely oriented skin incision along the medial border of the sternocleidomastoid was then marked out.  The overlying skin was then prepped and draped in the usual, sterile fashion.    A timeout was performed.  Intravenous antibiotics were administered.  The skin was anesthetized and opened using a #15 blade.  Hemostasis was achieved using bipolar and monopolar electrocautery.  The platysma was opened and the sternocleidomastoid was identified.  The avascular plane  medial to the sternocleidomastoid was then followed down until the jugular vein was encountered.  The patient then received 3000 units of intravenous heparin.  Dissection through the carotid sheath was then employed to expose the jugular vein and the transverse facial vein was then identified.  The transverse facial vein was then ligated and divided.  The jugular vein was then retracted laterally and the common carotid artery was then identified.  The common carotid artery was circumferentially dissected and a vessel loop was placed around it.  Dissection along the adventitia was then performed following the common carotid up to the bifurcation.  The superior thyroidal artery was identified and ligated with a 2 oh tie.  The external carotid was dissected free and a vessel loop was placed around it.  Dissection was then carried up along the internal carotid artery and a vessel loop was placed underneath the internal carotid artery several centimeters distal to the bifurcation after dissecting the internal carotid artery free circumferentially.    The patient's blood pressure was then taken up to 180 mm systolic.  The internal carotid artery was then temporarily clamped using a clip.  No changes in EEG were reported by the technologist.  A bulldog was used to temporarily close the common carotid artery and the run down was applied to the external carotid artery.  A #11 blade was used to make a small arteriotomy in the common carotid artery, and the De [place] scissors were then used to open the carotid bulb into the proximal internal carotid artery.  The plaque was then dissected free from the intima and was removed from the external carotid artery.  The external carotid artery was backbled.  The plaque was passed off the field to pathology.     The intima was meticulously inspected for intimal flaps.  These were removed using Khadra forceps.    A 6-0 Prolene suture was then used to close the arteriotomy in a running,  nonlocking stitch.  The internal carotid artery was backbled prior to closing the arteriotomy.  The patient's blood pressure was dropped to 140 mmHg.  The external, common, and internal clamps were then removed in reverse order.  Thrombin-soaked foam was used to establish hemostasis within the operative field and along the suture line.    A 7 flat drain was left in the subfascial space and tunneled out through separate stab incision.  The fascia and skin were then closed in layers.  Sponge, instrument, and needle counts were all correct at the conclusion of the case.    Naa Askew physician assistant was responsible for performing the following activities: Retraction, Suction, Irrigation, Suturing and Closing and their skilled assistance was necessary for the success of this case.

## 2022-09-13 NOTE — PLAN OF CARE
Goal Outcome Evaluation:  Plan of Care Reviewed With: patient, significant other        Progress: improving  Outcome Evaluation: admitted to ICU. Neuro WNL. NSR. VSS. RA. good appepite. no BM. adequate UOP. no c/o pain. neck drsg CDI. [place] to bulb sx. SO at bedside. updated, questions answered

## 2022-09-14 ENCOUNTER — READMISSION MANAGEMENT (OUTPATIENT)
Dept: CALL CENTER | Facility: HOSPITAL | Age: 62
End: 2022-09-14

## 2022-09-14 VITALS
WEIGHT: 162 LBS | HEART RATE: 58 BPM | HEIGHT: 62 IN | BODY MASS INDEX: 29.81 KG/M2 | DIASTOLIC BLOOD PRESSURE: 71 MMHG | TEMPERATURE: 98.2 F | OXYGEN SATURATION: 95 % | RESPIRATION RATE: 16 BRPM | SYSTOLIC BLOOD PRESSURE: 131 MMHG

## 2022-09-14 LAB
ANION GAP SERPL CALCULATED.3IONS-SCNC: 12 MMOL/L (ref 5–15)
BASOPHILS # BLD AUTO: 0.02 10*3/MM3 (ref 0–0.2)
BASOPHILS NFR BLD AUTO: 0.2 % (ref 0–1.5)
BUN SERPL-MCNC: 22 MG/DL (ref 8–23)
BUN/CREAT SERPL: 19 (ref 7–25)
CALCIUM SPEC-SCNC: 8.4 MG/DL (ref 8.6–10.5)
CHLORIDE SERPL-SCNC: 109 MMOL/L (ref 98–107)
CO2 SERPL-SCNC: 21 MMOL/L (ref 22–29)
CREAT SERPL-MCNC: 1.16 MG/DL (ref 0.57–1)
CYTO UR: NORMAL
DEPRECATED RDW RBC AUTO: 39.3 FL (ref 37–54)
EGFRCR SERPLBLD CKD-EPI 2021: 52.8 ML/MIN/1.73
EOSINOPHIL # BLD AUTO: 0 10*3/MM3 (ref 0–0.4)
EOSINOPHIL NFR BLD AUTO: 0 % (ref 0.3–6.2)
ERYTHROCYTE [DISTWIDTH] IN BLOOD BY AUTOMATED COUNT: 12.4 % (ref 12.3–15.4)
GLUCOSE BLDC GLUCOMTR-MCNC: 125 MG/DL (ref 70–130)
GLUCOSE BLDC GLUCOMTR-MCNC: 130 MG/DL (ref 70–130)
GLUCOSE SERPL-MCNC: 156 MG/DL (ref 65–99)
HCT VFR BLD AUTO: 30.3 % (ref 34–46.6)
HGB BLD-MCNC: 10.1 G/DL (ref 12–15.9)
IMM GRANULOCYTES # BLD AUTO: 0.07 10*3/MM3 (ref 0–0.05)
IMM GRANULOCYTES NFR BLD AUTO: 0.6 % (ref 0–0.5)
LAB AP CASE REPORT: NORMAL
LAB AP CLINICAL INFORMATION: NORMAL
LYMPHOCYTES # BLD AUTO: 1.85 10*3/MM3 (ref 0.7–3.1)
LYMPHOCYTES NFR BLD AUTO: 16 % (ref 19.6–45.3)
MCH RBC QN AUTO: 28.9 PG (ref 26.6–33)
MCHC RBC AUTO-ENTMCNC: 33.3 G/DL (ref 31.5–35.7)
MCV RBC AUTO: 86.8 FL (ref 79–97)
MONOCYTES # BLD AUTO: 0.79 10*3/MM3 (ref 0.1–0.9)
MONOCYTES NFR BLD AUTO: 6.8 % (ref 5–12)
NEUTROPHILS NFR BLD AUTO: 76.4 % (ref 42.7–76)
NEUTROPHILS NFR BLD AUTO: 8.85 10*3/MM3 (ref 1.7–7)
NRBC BLD AUTO-RTO: 0 /100 WBC (ref 0–0.2)
PATH REPORT.FINAL DX SPEC: NORMAL
PATH REPORT.GROSS SPEC: NORMAL
PLATELET # BLD AUTO: 197 10*3/MM3 (ref 140–450)
PMV BLD AUTO: 12.2 FL (ref 6–12)
POTASSIUM SERPL-SCNC: 3.9 MMOL/L (ref 3.5–5.2)
RBC # BLD AUTO: 3.49 10*6/MM3 (ref 3.77–5.28)
SODIUM SERPL-SCNC: 142 MMOL/L (ref 136–145)
WBC NRBC COR # BLD: 11.58 10*3/MM3 (ref 3.4–10.8)

## 2022-09-14 PROCEDURE — 25010000002 CEFAZOLIN IN DEXTROSE 2-4 GM/100ML-% SOLUTION: Performed by: NEUROLOGICAL SURGERY

## 2022-09-14 PROCEDURE — 82962 GLUCOSE BLOOD TEST: CPT

## 2022-09-14 PROCEDURE — 99024 POSTOP FOLLOW-UP VISIT: CPT

## 2022-09-14 PROCEDURE — 80048 BASIC METABOLIC PNL TOTAL CA: CPT | Performed by: NEUROLOGICAL SURGERY

## 2022-09-14 PROCEDURE — 99232 SBSQ HOSP IP/OBS MODERATE 35: CPT | Performed by: INTERNAL MEDICINE

## 2022-09-14 PROCEDURE — 85025 COMPLETE CBC W/AUTO DIFF WBC: CPT | Performed by: NEUROLOGICAL SURGERY

## 2022-09-14 RX ORDER — PANTOPRAZOLE SODIUM 40 MG/1
40 TABLET, DELAYED RELEASE ORAL
Qty: 30 TABLET | Refills: 0 | Status: SHIPPED | OUTPATIENT
Start: 2022-09-15 | End: 2022-10-15

## 2022-09-14 RX ORDER — ASPIRIN 81 MG/1
81 TABLET ORAL DAILY
Qty: 30 TABLET | Refills: 3 | Status: SHIPPED | OUTPATIENT
Start: 2022-09-15 | End: 2022-10-15

## 2022-09-14 RX ADMIN — FLUOXETINE 40 MG: 20 CAPSULE ORAL at 08:43

## 2022-09-14 RX ADMIN — OXYBUTYNIN CHLORIDE 5 MG: 5 TABLET ORAL at 08:43

## 2022-09-14 RX ADMIN — METOPROLOL SUCCINATE 50 MG: 50 TABLET, EXTENDED RELEASE ORAL at 08:43

## 2022-09-14 RX ADMIN — AMLODIPINE BESYLATE 10 MG: 10 TABLET ORAL at 08:42

## 2022-09-14 RX ADMIN — ATORVASTATIN CALCIUM 80 MG: 40 TABLET, FILM COATED ORAL at 08:42

## 2022-09-14 RX ADMIN — CEFAZOLIN SODIUM 2 G: 2 INJECTION, SOLUTION INTRAVENOUS at 00:12

## 2022-09-14 RX ADMIN — PANTOPRAZOLE SODIUM 40 MG: 40 TABLET, DELAYED RELEASE ORAL at 06:00

## 2022-09-14 RX ADMIN — CLOPIDOGREL BISULFATE 75 MG: 75 TABLET ORAL at 08:43

## 2022-09-14 RX ADMIN — ASPIRIN 81 MG: 81 TABLET, COATED ORAL at 08:42

## 2022-09-14 RX ADMIN — LOSARTAN POTASSIUM 100 MG: 50 TABLET, FILM COATED ORAL at 08:43

## 2022-09-14 NOTE — PROGRESS NOTES
Intensivist Note     9/14/2022  Hospital Day: 1  1 Day Post-Op  ICU Stays Timeline            Hospital Admission: 09/13/22 0558 - Current  ICU stays: 1      In Date/Time Event Department ICU Stay Duration     09/13/22 0558 Admission  [facility] OR      09/13/22 1456 Transfer In  [facility] 2B ICU 14 hours 13 minutes             Ms. Joie Garnica, 62 y.o. female is followed for:    Bilateral symptomatic carotid stenosis left greater than right    TIA involving left internal carotid artery 8/23/2022    JUDY (acute kidney injury) (HCC)    T2DM with HbA1c with HbA1c 6.6 (HCC)    HTN (hypertension)    Hyperlipidemia    COVID-19 infection 8/16/2022 not requiring therapy       SUBJECTIVE     62 y.o. female with PMH of DM (HbA1c 6.6), hypertension, hyperlipidemia, known extracranial cerebrovascular disease with bilateral carotid artery atherosclerosis, CVA with no residual 7 years ago, GERD, COVID infection 8/16/2022 not requiring therapy, and anxiety/depression.  Patient has been followed for years by Dr. Ralph in the neuro intervention clinic for her bilateral carotid atherosclerosis, but has not been seen for the last 2 to 3 years due to COVID.  She remained asymptomatic until a recent hospitalization at [facility] 8/23/2022 through 8/26/2022 after presenting with dizziness and slurred speech which was felt related to progression of her left ICA atherosclerosis with greater than 70% stenosis.  During that hospital stay patient underwent selective bilateral common carotid artery catheterization with diagnostic bilateral extracranial and intracranial carotid angiography. This confirmed a greater than 70% stenosis of the origin of the left ICA which explained her left hemispheric anterior circulation TIAs manifesting as dysarthria and word finding difficulties.  She was sent home on aspirin, Plavix, and high-dose statin and seen as an outpatient by Dr. Ku 9/2/2022.  It was recommended she undergo left CEA and she agreed.  On  "9/13/2022 patient was electively admitted and underwent successful left carotid endarterectomy with EEG monitoring.  Surgical course was uncomplicated and patient transferred to the ICU for monitoring and blood pressure management.    Interval history: Uneventful evening and denies any pain at the left cervical operative site today.  Presently sitting up in a chair and denies diplopia, blurred vision, unilateral weakness or sensory loss, nausea, vomiting, vertigo, or dizziness.  Denies chest pain, cough, purulent sputum production, hemoptysis.  Left cervical BETZAIDA drain in place with only 45 cc out since surgery yesterday.  Hemodynamics good with a blood pressure 149/59 and she remains in sinus rhythm with a heart rate of 60.  No complaints of dyspnea and O2 sat 96% on room air.  UOP adequate with 800 cc out over the last 24 hours and BUNs/creatinine have improved since yesterday dropping from 26/1.54, to 22/1.16.  Patient remains afebrile and WBC only 11.58.  Patient has completed her perioperative cefazolin.         ROS: Per subjective, all other systems reviewed and were negative.    The patient's relevant PMH, PSH, FH, and SH were reviewed and updated in Epic as appropriate. Allergies and Medications reviewed.    OBJECTIVE     /62 (BP Location: Left arm, Patient Position: Lying)   Pulse 62   Temp 98.6 °F (37 °C) (Oral)   Resp 16   Ht 157.5 cm (62\")   Wt 73.5 kg (162 lb)   SpO2 93%   BMI 29.63 kg/m²      Flow (L/min): 2    Flowsheet Rows    Flowsheet Row First Filed Value   Admission Height 157.5 cm (62\") Documented at 09/13/2022 0641   Admission Weight 73.5 kg (162 lb) Documented at 09/13/2022 0641        Intake & Output (last day)       09/13 0701  09/14 0700    P.O. 60    I.V. (mL/kg) 728 (9.9)    IV Piggyback 350    Total Intake(mL/kg) 1138 (15.5)    Urine (mL/kg/hr) 700 (0.4)    Drains 40    Total Output 740    Net +398               Exam:  General Exam:  Pleasant white female sitting up in bed in " NAD  HEENT: Pupils equal and reactive. Nose and throat clear.  Neck:                           Left CEA dressing clean and dry and only a small amount of drainage from left cervical BETZAIDA drain into the bulb.  Lungs: Clear to auscultation and percussion anteriorly and posteriorly.  No wheezes/rales/rhonchi  Cardiovascular: RRR without murmurs or gallops.  HR 60 bpm  Abdomen: Soft nontender without organomegaly or masses.   and rectal: Pure wick catheter in place  Extremities: No cyanosis clubbing edema.  Right radial arterial line in place  Neurologic:                 Symmetric strength. No focal deficits.      Chest X-Ray: Last portable film from 8/23/2022 was normal    INFUSIONS  niCARdipine, 5-15 mg/hr        Results from last 7 days   Lab Units 09/14/22  0406 09/12/22  0837   WBC 10*3/mm3 11.58* 7.26   HEMOGLOBIN g/dL 10.1* 12.2   HEMATOCRIT % 30.3* 36.8   PLATELETS 10*3/mm3 197 235     Results from last 7 days   Lab Units 09/14/22  0406 09/12/22  0837   SODIUM mmol/L 142 141   POTASSIUM mmol/L 3.9 3.5   CHLORIDE mmol/L 109* 103   CO2 mmol/L 21.0* 26.0   BUN mg/dL 22 26*   CREATININE mg/dL 1.16* 1.54*   GLUCOSE mg/dL 156* 183*   CALCIUM mg/dL 8.4* 9.3               No results found for: SEDRATE    No results found for: PROBNP      Procalitonin Results:          Covid Tests    Common Labsle 8/23/22   COVID19 Detected (A)   (A) Abnormal value             COVID LABS:  Results From Last 14 Days   Lab Units 09/12/22  0837   PROTIME Seconds 12.7   INR  0.96          Lab Results   Component Value Date    TROPONINT <0.010 08/23/2022     Lab Results   Component Value Date    TSH 3.590 08/24/2022     No results found for: LACTATE  No results found for: CORTISOL        I reviewed the patient's results, images and medication.    Assessment & Plan   ASSESSMENT        Bilateral symptomatic carotid stenosis left greater than right    TIA involving left internal carotid artery 8/23/2022    JUDY (acute kidney injury) (HCC)    T2DM  "with HbA1c with HbA1c 6.6 (HCC)    HTN (hypertension)    Hyperlipidemia    COVID-19 infection 8/16/2022 not requiring therapy      DISCUSSION: POD #1 with excellent postoperative course.  No complications evident and surgery PA getting ready to pull patient's left BETZAIDA drain and arterial line.  Will probably be discharged home later today on aspirin and Plavix.  In reviewing Accu-Cheks I note her blood sugars were high yesterday but was immediately postop.  Although had replacement the patient would not let insulin be given because \"she does not take it at home\".    PLAN     Arterial line and left cervical BETZAIDA drain to be pulled  Aspirin and Plavix per surgery  High-dose Lipitor 80 mg daily  Continue other home medications  Should continue her home treatment of diabetes (HbA1c was 6.6)  We will be available prn    Plan of care and goals reviewed with multidisciplinary team at daily rounds.    I discussed the patient's findings and my recommendations with patient and nursing staff    Time spent Critical care 20 min (It does not include procedure time).    Electronically signed by Rc Rodríguez MD, 09/14/22, 5:09 AM EDT.   Pulmonary / Critical care medicine       "

## 2022-09-14 NOTE — PLAN OF CARE
Goal Outcome Evaluation:   Patient alert and oriented x4. No c/o pain or any discomfort. Incision site dressing intact and no drainage, no swelling noted. Patient heart rate ramiro to 49 around 0200, pt was asymptomatic and was watching tv. Patient voiced that she is ready to go home. We will continue to monitor patient.

## 2022-09-14 NOTE — PROGRESS NOTES
[facility]    Joie Garnica  1960  [id number]      Bilateral symptomatic carotid stenosis left greater than right    COVID-19 infection 8/16/2022 not requiring therapy    T2DM with HbA1c with HbA1c 6.6 ([facility])    HTN (hypertension)    JUDY (acute kidney injury) ([facility])    TIA involving left internal carotid artery 8/23/2022    Hyperlipidemia      Admit Diagnosis: Left carotid stenosis [I65.22]  Carotid stenosis [I65.29]  Date of Admission:  9/14/2022     Interval History: Patient was admitted following her left sided carotid endarterectomy, her drain put out around 40 mL of blood, otherwise the patient feels great.  She denies any weakness on one side, facial droop, slurring words, visual changes.  Has not been taking pain medications and feels great following her surgery.    Post-Op Day: 1  Past Medical History:   Diagnosis Date   • Anxiety    • Diabetes mellitus ([facility])    • GERD (gastroesophageal reflux disease)    • Hypertension    • Stroke ([facility])        No Known Allergies      Current Facility-Administered Medications:   •  amLODIPine (NORVASC) tablet 10 mg, 10 mg, Oral, Daily, Markel Ku MD, 10 mg at 09/14/22 0842  •  aspirin EC tablet 81 mg, 81 mg, Oral, Daily, Markel Ku MD, 81 mg at 09/14/22 0842  •  atorvastatin (LIPITOR) tablet 80 mg, 80 mg, Oral, Daily, Markel Ku MD, 80 mg at 09/14/22 0842  •  clopidogrel (PLAVIX) tablet 75 mg, 75 mg, Oral, Daily, Markel Ku MD, 75 mg at 09/14/22 0843  •  dextrose (D50W) (25 g/50 mL) IV injection 25 g, 25 g, Intravenous, Q15 Min PRN, Rc Rodríguez MD  •  dextrose (GLUTOSE) oral gel 15 g, 15 g, Oral, Q15 Min PRN, Rc Rodríguez MD  •  FLUoxetine (PROzac) capsule 40 mg, 40 mg, Oral, Daily, Markel Ku MD, 40 mg at 09/14/22 0843  •  glucagon (human recombinant) (GLUCAGEN DIAGNOSTIC) injection 1 mg, 1 mg, Intramuscular, Q15 Min  "PRN, Rc Rodríguez MD  •  HYDROcodone-acetaminophen (NORCO) 5-325 MG per tablet 1 tablet, 1 tablet, Oral, Q4H PRN, Markel Ku MD  •  Insulin Lispro (humaLOG) injection 0-7 Units, 0-7 Units, Subcutaneous, TID AC, Rc Rodríguez MD  •  losartan (COZAAR) tablet 100 mg, 100 mg, Oral, Daily, Markel Ku MD, 100 mg at 09/14/22 0843  •  metoprolol succinate XL (TOPROL-XL) 24 hr tablet 50 mg, 50 mg, Oral, Daily, Markel Ku MD, 50 mg at 09/14/22 0843  •  niCARdipine (CARDENE) 25mg in 250mL NS infusion, 5-15 mg/hr, Intravenous, Titrated, Markel Ku MD  •  oxybutynin (DITROPAN) tablet 5 mg, 5 mg, Oral, BID, Markel Ku MD, 5 mg at 09/14/22 0843  •  pantoprazole (PROTONIX) EC tablet 40 mg, 40 mg, Oral, Q AM, Markel Ku MD, 40 mg at 09/14/22 0600  •  sodium chloride 0.9 % flush 10 mL, 10 mL, Intravenous, Q12H, Markel Ku MD, 10 mL at 09/13/22 2200  •  sodium chloride 0.9 % flush 10 mL, 10 mL, Intravenous, PRN, Markel Ku MD  •  sodium chloride 0.9 % infusion, 9 mL/hr, Intravenous, Continuous, Markel Ku MD, Last Rate: 9 mL/hr at 09/13/22 0735, Restarted at 09/13/22 0944    Physical Exam:  Blood pressure 139/61, pulse 62, temperature 98.6 °F (37 °C), temperature source Oral, resp. rate 16, height 157.5 cm (62\"), weight 73.5 kg (162 lb), SpO2 96 %.  No intake/output data recorded.  Physical Exam  Constitutional:       General: She is not in acute distress.     Appearance: Normal appearance. She is not ill-appearing, toxic-appearing or diaphoretic.   HENT:      Head: Normocephalic and atraumatic.      Right Ear: External ear normal.      Left Ear: External ear normal.   Eyes:      General: No scleral icterus.        Right eye: No discharge.         Left eye: No discharge.   Pulmonary:      Effort: Pulmonary effort is normal. No respiratory distress.   Musculoskeletal:      Cervical back: Normal " range of motion.      Right lower leg: No edema.      Left lower leg: No edema.      Comments: Patient had 5 out of 5 strength in intrinsic hand muscles bilaterally, and 5 out of 5 strength with hip flexion bilaterally.   Skin:     Comments: Incision on the left neck is a little bloody and erythematous, however is healing well.  The closure remains intact, and there is no concerning discharge from the site.   Neurological:      Mental Status: She is alert and oriented to person, place, and time.      GCS: GCS eye subscore is 4. GCS verbal subscore is 5. GCS motor subscore is 6.      Cranial Nerves: No cranial nerve deficit or facial asymmetry.      Sensory: Sensation is intact. No sensory deficit.      Motor: No weakness or tremor.   Psychiatric:         Mood and Affect: Mood normal.         Behavior: Behavior normal.         Thought Content: Thought content normal.         Judgment: Judgment normal.          Data Review:   (All imaging reviewed independently unless state otherwise)  No new data to review at this time.    Medical Decision Making:   Discussed case with Dr. Donald Ku, we are aiming for discharge today.  Patient will follow-up in 2 weeks with Dr. Donald Ku, she will be placed on Plavix as well as aspirin, and get an ultrasound prior to her appointment with Dr. Ku.  Her drain was removed by Louie Colorado PA-C.  She was encouraged to leave incision alone until her follow-up.    Louie Colorado PA-C    9/14/2022  Markel Ku*  Markel Ku MD

## 2022-09-14 NOTE — CASE MANAGEMENT/SOCIAL WORK
Discharge Planning Assessment  [facility]     Patient Name: Joie Garnica  MRN: [id number]  Today's Date: 9/14/2022    Admit Date: 9/13/2022     Discharge Needs Assessment     Row Name 09/14/22 1059       Living Environment    People in Home significant other    Current Living Arrangements home    Primary Care Provided by self    Provides Primary Care For no one    Family Caregiver if Needed significant other    Able to Return to Prior Arrangements yes       Resource/Environmental Concerns    Resource/Environmental Concerns none       Transition Planning    Patient/Family Anticipates Transition to home with family    Patient/Family Anticipated Services at Transition none    Transportation Anticipated family or friend will provide       Discharge Needs Assessment    Readmission Within the Last 30 Days no previous admission in last 30 days    Equipment Currently Used at Home none    Concerns to be Addressed no discharge needs identified;denies needs/concerns at this time    Anticipated Changes Related to Illness none    Equipment Needed After Discharge none               Discharge Plan     Row Name 09/14/22 1056       Plan    Plan Home    Patient/Family in Agreement with Plan yes    Plan Comments Met & spoke with Ms Garnica at the bedside. She lives with her fiance in [facility]. At baseline, is ind with ADL's/mobility. Denies DME/HH/Rehab/O2. No POA/living will. DC order in place. No needs voiced. Plan is home and her fiance will transport.    Final Discharge Disposition Code 01 - home or self-care              Continued Care and Services - Admitted Since 9/13/2022    Coordination has not been started for this encounter.       Expected Discharge Date and Time     Expected Discharge Date Expected Discharge Time    Sep 14, 2022          Demographic Summary     Row Name 09/14/22 1058       General Information    Admission Type inpatient    General Information Comments Verified PCP is Andie Chambers,  APRN. Primary insurance is [employer] [place]. Has drug coverage.       Contact Information    Permission Granted to Share Info With     Contact Information Obtained for                Functional Status     Row Name 09/14/22 1058       Functional Status    Usual Activity Tolerance good    Current Activity Tolerance good       Functional Status, IADL    Medications independent    Meal Preparation independent    Housekeeping independent    Laundry independent    Shopping independent               Psychosocial    No documentation.                Abuse/Neglect    No documentation.                Legal    No documentation.                Substance Abuse    No documentation.                Patient Forms    No documentation.                   Lisa Pires RN

## 2022-09-14 NOTE — DISCHARGE SUMMARY
Condition on Discharge: Satisfactory and stable  Discharge to: Home    PATIENT SPECIFIC EDUCATION/PLAN:  1. Follow-up with Neurosurgery in 2 weeks  2. No driving until follow-up.  3. No lifting greater than 10 pounds  4. The patient may get incision wet in the shower beginning on post-op day 4. Can keep dressing off, wear clean shirt everyday.  5. NO tub bathing or swimming until follow-up  6. Ice pack to incision(s) as needed for associated pain or swelling    7.  Does not need to be doing any strenuous activity until follow-up with Dr. Ku    Discharge Medications     Discharge Medications      New Medications      Instructions Start Date   pantoprazole 40 MG EC tablet  Commonly known as: PROTONIX  Replaces: omeprazole 20 MG capsule   40 mg, Oral, Every Early Morning   Start Date: September 15, 2022        Changes to Medications      Instructions Start Date   aspirin 81 MG EC tablet  What changed: additional instructions   81 mg, Oral, Daily   Start Date: September 15, 2022        Continue These Medications      Instructions Start Date   atorvastatin 80 MG tablet  Commonly known as: Lipitor   80 mg, Oral, Daily      clopidogrel 75 MG tablet  Commonly known as: PLAVIX   75 mg, Oral, Daily      FLUoxetine 40 MG capsule  Commonly known as: PROzac   40 mg, Oral, Daily      glipizide 5 MG tablet  Commonly known as: GLUCOTROL   0.5 tablets, Oral, Daily      losartan 100 MG tablet  Commonly known as: COZAAR   1 tablet, Oral, Daily      meclizine 25 MG tablet  Commonly known as: ANTIVERT   25 mg, Oral, 3 Times Daily PRN, Already has a prescription at home for this from her PCP      metoprolol succinate XL 50 MG 24 hr tablet  Commonly known as: TOPROL-XL   50 mg, Oral, Daily      oxybutynin 5 MG tablet  Commonly known as: DITROPAN   5 mg, Oral, 2 Times Daily      SITagliptin 100 MG tablet  Commonly known as: JANUVIA   1 tablet, Oral, Daily         Stop These Medications    chlorhexidine 4 % external liquid  Commonly  known as: HIBICLENS     omeprazole 20 MG capsule  Commonly known as: priLOSEC  Replaced by: pantoprazole 40 MG EC tablet        ASK your doctor about these medications      Instructions Start Date   amLODIPine 10 MG tablet  Commonly known as: NORVASC   10 mg, Oral, Daily             Follow-up Appointments  2 weeks with Dr. Donald Ku.      Referring Provider  Markel Ku*    PCP   Andie Chambers APRN Tyler Jors, PA-C  09/14/22  12:30 EDT

## 2022-09-15 NOTE — OUTREACH NOTE
Prep Survey    Flowsheet Row Responses   [facility] patient discharged from? [place]   Is LACE score < 7 ? No   Emergency Room discharge w/ pulse ox? No   Eligibility Readm Mgmt   Discharge diagnosis Bilateral symptomatic carotid stenosis,    CAROTID ENDARTERECTOMY    Does the patient have one of the following disease processes/diagnoses(primary or secondary)? Other   Does the patient have Home health ordered? No   Is there a DME ordered? No   Prep survey completed? Yes          HAYDEE MATTHEW - Registered Nurse

## 2022-09-27 ENCOUNTER — READMISSION MANAGEMENT (OUTPATIENT)
Dept: CALL CENTER | Facility: HOSPITAL | Age: 62
End: 2022-09-27

## 2022-09-27 NOTE — OUTREACH NOTE
Medical Week 2 Survey    Flowsheet Row Responses   [facility] patient discharged from? [place]   Does the patient have one of the following disease processes/diagnoses(primary or secondary)? Other   Week 2 attempt successful? Yes   Call start time 1240   Discharge diagnosis Bilateral symptomatic carotid stenosis,    CAROTID ENDARTERECTOMY    Call end time 1242   Is patient permission given to speak with other caregiver? Yes   List who call center can speak with SO-[facility]   Person spoke with today (if not patient) and relationship SO-[facility]   Meds reviewed with patient/caregiver? Yes   Is the patient having any side effects they believe may be caused by any medication additions or changes? No   Does the patient have all medications ordered at discharge? Yes   Is the patient taking all medications as directed (includes completed medication regime)? Yes   Comments regarding appointments Post-op appt 9/30/22   Does the patient have a primary care provider?  Yes   Does the patient have an appointment with their PCP within 7 days of discharge? No   Comments regarding PCP S/W significant other, not sure if PCP follow up has been made   Nursing Interventions Educated patient on importance of making appointment, Advised patient to make appointment   Has the patient kept scheduled appointments due by today? N/A   Has home health visited the patient within 72 hours of discharge? N/A   Psychosocial issues? No   Did the patient receive a copy of their discharge instructions? Yes   Nursing interventions Reviewed instructions with patient   What is the patient's perception of their health status since discharge? Improving   Is the patient/caregiver able to teach back signs and symptoms related to disease process for when to call PCP? Yes   Is the patient/caregiver able to teach back signs and symptoms related to disease process for when to call 911? Yes   Is the patient/caregiver able to teach back the hierarchy of who to  call/visit for symptoms/problems? PCP, Specialist, Home health nurse, Urgent Care, ED, 911 Yes   If the patient is a current smoker, are they able to teach back resources for cessation? Not a smoker   Week 2 Call Completed? Yes   Wrap up additional comments S/W significant other, he reports incision helaing well, no concerns.          BILLIE LEAL - Registered Nurse

## 2022-09-30 ENCOUNTER — HOSPITAL ENCOUNTER (OUTPATIENT)
Dept: CARDIOLOGY | Facility: HOSPITAL | Age: 62
Discharge: HOME | End: 2022-09-30
Admitting: NEUROLOGICAL SURGERY

## 2022-09-30 ENCOUNTER — OFFICE VISIT (OUTPATIENT)
Dept: NEUROSURGERY | Facility: CLINIC | Age: 62
End: 2022-09-30

## 2022-09-30 VITALS
HEIGHT: 62 IN | DIASTOLIC BLOOD PRESSURE: 72 MMHG | SYSTOLIC BLOOD PRESSURE: 140 MMHG | TEMPERATURE: 97.1 F | BODY MASS INDEX: 30.36 KG/M2 | WEIGHT: 165 LBS

## 2022-09-30 DIAGNOSIS — Z98.890 H/O CAROTID ENDARTERECTOMY: Primary | ICD-10-CM

## 2022-09-30 LAB
BH CV XLRA MEAS LEFT DIST CCA EDV: 25.5 CM/SEC
BH CV XLRA MEAS LEFT DIST CCA PSV: 107 CM/SEC
BH CV XLRA MEAS LEFT DIST ICA EDV: 21.6 CM/SEC
BH CV XLRA MEAS LEFT DIST ICA PSV: 61.9 CM/SEC
BH CV XLRA MEAS LEFT ICA/CCA RATIO: 1.49
BH CV XLRA MEAS LEFT MID CCA EDV: 27.3 CM/SEC
BH CV XLRA MEAS LEFT MID CCA PSV: 109 CM/SEC
BH CV XLRA MEAS LEFT MID ICA EDV: 50.8 CM/SEC
BH CV XLRA MEAS LEFT MID ICA PSV: 162 CM/SEC
BH CV XLRA MEAS LEFT PROX CCA EDV: 17.4 CM/SEC
BH CV XLRA MEAS LEFT PROX CCA PSV: 86.4 CM/SEC
BH CV XLRA MEAS LEFT PROX ECA EDV: 17.2 CM/SEC
BH CV XLRA MEAS LEFT PROX ECA PSV: 127 CM/SEC
BH CV XLRA MEAS LEFT PROX ICA EDV: 32.9 CM/SEC
BH CV XLRA MEAS LEFT PROX ICA PSV: 147 CM/SEC
BH CV XLRA MEAS LEFT PROX SCLA PSV: 150 CM/SEC
BH CV XLRA MEAS LEFT VERTEBRAL A EDV: 13.8 CM/SEC
BH CV XLRA MEAS LEFT VERTEBRAL A PSV: 69.3 CM/SEC
BH CV XLRA MEAS RIGHT DIST CCA EDV: 22.6 CM/SEC
BH CV XLRA MEAS RIGHT DIST CCA PSV: 87.5 CM/SEC
BH CV XLRA MEAS RIGHT DIST ICA EDV: 26.3 CM/SEC
BH CV XLRA MEAS RIGHT DIST ICA PSV: 94.9 CM/SEC
BH CV XLRA MEAS RIGHT ICA/CCA RATIO: 1.39
BH CV XLRA MEAS RIGHT MID CCA EDV: 24.9 CM/SEC
BH CV XLRA MEAS RIGHT MID CCA PSV: 88.8 CM/SEC
BH CV XLRA MEAS RIGHT MID ICA EDV: 16.2 CM/SEC
BH CV XLRA MEAS RIGHT MID ICA PSV: 91.3 CM/SEC
BH CV XLRA MEAS RIGHT PROX CCA EDV: 22.4 CM/SEC
BH CV XLRA MEAS RIGHT PROX CCA PSV: 110 CM/SEC
BH CV XLRA MEAS RIGHT PROX ECA EDV: 13.3 CM/SEC
BH CV XLRA MEAS RIGHT PROX ECA PSV: 118 CM/SEC
BH CV XLRA MEAS RIGHT PROX ICA EDV: 32.9 CM/SEC
BH CV XLRA MEAS RIGHT PROX ICA PSV: 100 CM/SEC
BH CV XLRA MEAS RIGHT PROX SCLA PSV: 117 CM/SEC
BH CV XLRA MEAS RIGHT VERTEBRAL A EDV: 16.2 CM/SEC
BH CV XLRA MEAS RIGHT VERTEBRAL A PSV: 48.7 CM/SEC
LEFT ARM BP: NORMAL MMHG
MAXIMAL PREDICTED HEART RATE: 156 BPM
RIGHT ARM BP: NORMAL MMHG
STRESS TARGET HR: 133 BPM

## 2022-09-30 PROCEDURE — 93880 EXTRACRANIAL BILAT STUDY: CPT

## 2022-09-30 PROCEDURE — 93880 EXTRACRANIAL BILAT STUDY: CPT | Performed by: INTERNAL MEDICINE

## 2022-09-30 PROCEDURE — 99024 POSTOP FOLLOW-UP VISIT: CPT | Performed by: NEUROLOGICAL SURGERY

## 2022-09-30 RX ORDER — ATORVASTATIN CALCIUM 80 MG/1
80 TABLET, FILM COATED ORAL DAILY
Qty: 30 TABLET | Refills: 0 | Status: SHIPPED | OUTPATIENT
Start: 2022-09-30 | End: 2022-11-02

## 2022-09-30 NOTE — PROGRESS NOTES
Subjective     Chief Complaint: Follow-up carotid endarterectomy    Patient ID: Joie Garnica is a 62 y.o. female is here today for follow-up.    History of Present Illness    This is a 62-year-old woman in whom I performed a left carotid endarterectomy about 2 weeks ago.  She presents today for routine follow-up.  She denies any numbness, tingling, weakness, dysarthria, or vision loss.  She is still taking aspirin but recently ran out of Plavix.  She is having some numbness anterior to her suture line on the left side.    The following portions of the patient's history were reviewed and updated as appropriate: allergies, current medications, past family history, past medical history, past social history, past surgical history and problem list.    Family history:   Family History   Problem Relation Age of Onset   • No Known Problems Mother    • Heart attack Father        Social history:   Social History     Socioeconomic History   • Marital status:    Tobacco Use   • Smoking status: Never Smoker   • Smokeless tobacco: Never Used   Vaping Use   • Vaping Use: Never used   Substance and Sexual Activity   • Alcohol use: Yes     Comment: rarely   • Drug use: No   • Sexual activity: Defer       Review of Systems   Constitutional: Negative for activity change, appetite change, chills, diaphoresis, fatigue, fever and unexpected weight change.   HENT: Negative for congestion, dental problem, drooling, ear discharge, ear pain, facial swelling, hearing loss, mouth sores, nosebleeds, postnasal drip, rhinorrhea, sinus pressure, sinus pain, sneezing, sore throat, tinnitus, trouble swallowing and voice change.    Eyes: Negative for photophobia, pain, discharge, redness, itching and visual disturbance.   Respiratory: Negative for apnea, cough, choking, chest tightness, shortness of breath, wheezing and stridor.    Cardiovascular: Negative for chest pain, palpitations and leg swelling.   Gastrointestinal: Negative  "for abdominal distention, abdominal pain, anal bleeding, blood in stool, constipation, diarrhea, nausea, rectal pain and vomiting.   Endocrine: Negative for cold intolerance, heat intolerance, polydipsia, polyphagia and polyuria.   Genitourinary: Negative for decreased urine volume, difficulty urinating, dysuria, enuresis, flank pain, frequency, genital sores, hematuria and urgency.   Musculoskeletal: Negative for arthralgias, back pain, gait problem, joint swelling, myalgias, neck pain and neck stiffness.   Skin: Negative for color change, pallor, rash and wound.   Allergic/Immunologic: Negative for environmental allergies, food allergies and immunocompromised state.   Neurological: Negative for dizziness, tremors, seizures, syncope, facial asymmetry, speech difficulty, weakness, light-headedness, numbness and headaches.   Hematological: Negative for adenopathy. Does not bruise/bleed easily.   Psychiatric/Behavioral: Negative for agitation, behavioral problems, confusion, decreased concentration, dysphoric mood, hallucinations, self-injury, sleep disturbance and suicidal ideas. The patient is not nervous/anxious and is not hyperactive.        Objective   Blood pressure 140/72, temperature 97.1 °F (36.2 °C), height 157.5 cm (62\"), weight 74.8 kg (165 lb).  Body mass index is 30.18 kg/m².    Physical Exam    Assessment & Plan     Independent Review of Radiographic Studies:      Available for my review is a carotid ultrasound which was performed earlier today.  A comparison study from before her surgery on 8/24/2022 is also available for my review.  There has been significant improvement in the waveforms and velocities of the left internal carotid artery with peak systolic velocities of 162 cm/s with a ratio of 1.49.    Medical Decision Making:      Surveillance ultrasound in 6 months.  Signs and symptoms of stroke were reviewed with the patient.  I will follow-up with her after her next ultrasound.  She can stop her " Plavix but should remain on aspirin and high-dose statin.    Diagnoses and all orders for this visit:    1. H/O carotid endarterectomy (Primary)  -     Duplex Carotid Ultrasound CAR; Future    Other orders  -     atorvastatin (Lipitor) 80 MG tablet; Take 1 tablet by mouth Daily.  Dispense: 30 tablet; Refill: 0        Return in about 6 months (around 3/30/2023).           This document signed by NEYDA Ku MD September 30, 2022 14:49 EDT

## 2022-10-05 ENCOUNTER — READMISSION MANAGEMENT (OUTPATIENT)
Dept: CALL CENTER | Facility: HOSPITAL | Age: 62
End: 2022-10-05

## 2022-10-05 NOTE — OUTREACH NOTE
Medical Week 3 Survey    Flowsheet Row Responses   [facility] patient discharged from? [place]   Does the patient have one of the following disease processes/diagnoses(primary or secondary)? Other   Week 3 attempt successful? Yes   Call start time 1243   Call end time 1257   Discharge diagnosis Bilateral symptomatic carotid stenosis,    CAROTID ENDARTERECTOMY    Person spoke with today (if not patient) and relationship Patient   Meds reviewed with patient/caregiver? Yes   Is the patient having any side effects they believe may be caused by any medication additions or changes? No   Does the patient have all medications ordered at discharge? Yes   Is the patient taking all medications as directed (includes completed medication regime)? Yes   Does the patient have a primary care provider?  Yes   Does the patient have an appointment with their PCP within 7 days of discharge? Yes   Has the patient kept scheduled appointments due by today? Yes   Psychosocial issues? No   What is the patient's perception of their health status since discharge? Improving   Is the patient/caregiver able to teach back signs and symptoms related to disease process for when to call PCP? Yes   Is the patient/caregiver able to teach back signs and symptoms related to disease process for when to call 911? Yes   Is the patient/caregiver able to teach back the hierarchy of who to call/visit for symptoms/problems? PCP, Specialist, Home health nurse, Urgent Care, ED, 911 Yes   Week 3 Call Completed? Yes   Graduated Yes   Did the patient feel the follow up calls were helpful during their recovery period? Yes   Was the number of calls appropriate? Yes   Does the patient have an Advance Directive or Living Will? No   Is the patient/caregiver familiar with Advance Care Planning? No   Would the patient like more information on Advance Care Planning? No   Graduated/Revoked comments Pt states she is doing much better, and reports no dizziness since  surgery. Pt does not feel she needs further calls   Wrap up additional comments Pt states she is doing much better, and reports no dizziness since surgery. Pt had PCP fu appt. No questions/concerns.           CLAUDIA MOSES - Registered Nurse

## 2022-10-06 RX ORDER — PANTOPRAZOLE SODIUM 40 MG/1
40 TABLET, DELAYED RELEASE ORAL
Refills: 0 | OUTPATIENT
Start: 2022-10-06 | End: 2022-11-05

## 2022-10-06 NOTE — TELEPHONE ENCOUNTER
"Provider:  [facility]  Caller: Patient's [employer] message   Surgery:  CAROTID ENDARTERECTOMY WITH EEG LEFT  Surgery Date:  09/13/2022  Last visit:   Office Visit with Markel Ku MD (09/30/2022)  Next visit: 03/27/2023  Last filled: 09/14/2022      Reason for call:       See [email or web address] message from patient below      \"----- Message from Joie Garnica sent at 10/6/2022 10:40 AM EDT -----  Regarding: Pantoprazole sod 40mg  Hi, I was wondering if I am going  to continue taking the Pantoprazole that was changed during my hospital stay ?  I was taking  Omeprazole. If I am to continue then I will need a prescription sent to [facility].  Please order for 90 days.  Thank you,   Joie Garnica\"    Patient had a LEFT CEA with EEG on 09/13/2022. Medication pending. Please sign if it is okay for patient to start re-taking.     \"SIG: Take 1 tablet by mouth Every Morning for 30 days.\"  Patient is requesting 90 days worth to be refilled.     Requested Prescriptions     Pending Prescriptions Disp Refills   • pantoprazole (PROTONIX) 40 MG EC tablet 30 tablet 0     Sig: Take 1 tablet by mouth Every Morning for 30 days.     Per last OV note:    \"Medical Decision Making:       Surveillance ultrasound in 6 months.  Signs and symptoms of stroke were reviewed with the patient.  I will follow-up with her after her next ultrasound.  She can stop her Plavix but should remain on aspirin and high-dose statin.\"  "

## 2022-10-06 NOTE — TELEPHONE ENCOUNTER
Spoke with the patient, she was previously taking omeprazole and was switched to pantoprazole.  She can discontinue the pantoprazole and move back to omeprazole.

## 2022-11-02 RX ORDER — ATORVASTATIN CALCIUM 80 MG/1
TABLET, FILM COATED ORAL
Qty: 60 TABLET | Refills: 6 | Status: SHIPPED | OUTPATIENT
Start: 2022-11-02

## 2022-11-02 NOTE — TELEPHONE ENCOUNTER
Provider:  [facility]  Surgery/Procedure:  CEA left  Surgery/Procedure Date:  9/13/2022  Last visit:   9/30/22  Next visit: 3/27/23     Reason for call:  Requested Prescriptions     Pending Prescriptions Disp Refills   • atorvastatin (LIPITOR) 80 MG tablet [Pharmacy Med Name: Atorvastatin Calcium 80 MG Oral Tablet] 30 tablet 0     Sig: Take 1 tablet by mouth once daily

## 2023-03-27 ENCOUNTER — OFFICE VISIT (OUTPATIENT)
Dept: NEUROSURGERY | Facility: CLINIC | Age: 63
End: 2023-03-27
Payer: COMMERCIAL

## 2023-03-27 ENCOUNTER — HOSPITAL ENCOUNTER (OUTPATIENT)
Dept: CARDIOLOGY | Facility: HOSPITAL | Age: 63
Discharge: HOME | End: 2023-03-27
Admitting: NEUROLOGICAL SURGERY
Payer: COMMERCIAL

## 2023-03-27 VITALS — WEIGHT: 162 LBS | BODY MASS INDEX: 29.81 KG/M2 | HEIGHT: 62 IN

## 2023-03-27 VITALS — BODY MASS INDEX: 30.47 KG/M2 | HEIGHT: 62 IN | WEIGHT: 165.6 LBS

## 2023-03-27 DIAGNOSIS — I65.22 LEFT CAROTID STENOSIS: Primary | ICD-10-CM

## 2023-03-27 DIAGNOSIS — Z98.890 H/O CAROTID ENDARTERECTOMY: ICD-10-CM

## 2023-03-27 LAB
BH CV XLRA MEAS LEFT DIST CCA EDV: 33.8 CM/SEC
BH CV XLRA MEAS LEFT DIST CCA PSV: 99.8 CM/SEC
BH CV XLRA MEAS LEFT DIST ICA EDV: 29.9 CM/SEC
BH CV XLRA MEAS LEFT DIST ICA PSV: 88.5 CM/SEC
BH CV XLRA MEAS LEFT ICA/CCA RATIO: 1.53
BH CV XLRA MEAS LEFT MID CCA EDV: 25.9 CM/SEC
BH CV XLRA MEAS LEFT MID CCA PSV: 92.7 CM/SEC
BH CV XLRA MEAS LEFT MID ICA EDV: 44 CM/SEC
BH CV XLRA MEAS LEFT MID ICA PSV: 140 CM/SEC
BH CV XLRA MEAS LEFT PROX CCA EDV: 26.7 CM/SEC
BH CV XLRA MEAS LEFT PROX CCA PSV: 116 CM/SEC
BH CV XLRA MEAS LEFT PROX ECA EDV: 20.4 CM/SEC
BH CV XLRA MEAS LEFT PROX ECA PSV: 120 CM/SEC
BH CV XLRA MEAS LEFT PROX ICA EDV: 38.5 CM/SEC
BH CV XLRA MEAS LEFT PROX ICA PSV: 142 CM/SEC
BH CV XLRA MEAS LEFT PROX SCLA PSV: 148 CM/SEC
BH CV XLRA MEAS LEFT VERTEBRAL A EDV: 16.9 CM/SEC
BH CV XLRA MEAS LEFT VERTEBRAL A PSV: 66 CM/SEC
BH CV XLRA MEAS RIGHT DIST CCA EDV: 20.5 CM/SEC
BH CV XLRA MEAS RIGHT DIST CCA PSV: 77.4 CM/SEC
BH CV XLRA MEAS RIGHT DIST ICA EDV: 29.9 CM/SEC
BH CV XLRA MEAS RIGHT DIST ICA PSV: 98.2 CM/SEC
BH CV XLRA MEAS RIGHT ICA/CCA RATIO: 1.47
BH CV XLRA MEAS RIGHT MID CCA EDV: 19.3 CM/SEC
BH CV XLRA MEAS RIGHT MID CCA PSV: 89.1 CM/SEC
BH CV XLRA MEAS RIGHT MID ICA EDV: 46.4 CM/SEC
BH CV XLRA MEAS RIGHT MID ICA PSV: 131 CM/SEC
BH CV XLRA MEAS RIGHT PROX CCA EDV: 22.3 CM/SEC
BH CV XLRA MEAS RIGHT PROX CCA PSV: 87.9 CM/SEC
BH CV XLRA MEAS RIGHT PROX ECA EDV: 20.4 CM/SEC
BH CV XLRA MEAS RIGHT PROX ECA PSV: 112 CM/SEC
BH CV XLRA MEAS RIGHT PROX ICA EDV: 47.9 CM/SEC
BH CV XLRA MEAS RIGHT PROX ICA PSV: 123 CM/SEC
BH CV XLRA MEAS RIGHT PROX SCLA PSV: 168 CM/SEC
BH CV XLRA MEAS RIGHT VERTEBRAL A EDV: 17.7 CM/SEC
BH CV XLRA MEAS RIGHT VERTEBRAL A PSV: 44.4 CM/SEC
MAXIMAL PREDICTED HEART RATE: 155 BPM
STRESS TARGET HR: 132 BPM

## 2023-03-27 PROCEDURE — 99214 OFFICE O/P EST MOD 30 MIN: CPT | Performed by: NEUROLOGICAL SURGERY

## 2023-03-27 PROCEDURE — 93880 EXTRACRANIAL BILAT STUDY: CPT

## 2023-03-27 PROCEDURE — 93880 EXTRACRANIAL BILAT STUDY: CPT | Performed by: INTERNAL MEDICINE

## 2023-03-27 RX ORDER — OMEPRAZOLE 20 MG/1
1 CAPSULE, DELAYED RELEASE ORAL DAILY
COMMUNITY
Start: 2023-02-20

## 2023-03-27 NOTE — PROGRESS NOTES
Subjective     Chief Complaint: Follow-up carotid endarterectomy    Patient ID: Joie Garnica is a 63 y.o. female is here today for follow-up.    History of Present Illness    This is a 63-year-old woman in whom I performed a left carotid endarterectomy last year.  She presents today to discuss the results of her 6-month surveillance ultrasound.    The following portions of the patient's history were reviewed and updated as appropriate: allergies, current medications, past family history, past medical history, past social history, past surgical history and problem list.    Family history:   Family History   Problem Relation Age of Onset   • No Known Problems Mother    • Heart attack Father        Social history:   Social History     Socioeconomic History   • Marital status: Significant Other   Tobacco Use   • Smoking status: Never   • Smokeless tobacco: Never   Vaping Use   • Vaping Use: Never used   Substance and Sexual Activity   • Alcohol use: Yes     Comment: rarely   • Drug use: No   • Sexual activity: Defer       Review of Systems   Constitutional: Negative for activity change, appetite change, chills, diaphoresis, fatigue, fever and unexpected weight change.   HENT: Negative for congestion, dental problem, drooling, ear discharge, ear pain, facial swelling, hearing loss, mouth sores, nosebleeds, postnasal drip, rhinorrhea, sinus pressure, sinus pain, sneezing, sore throat, tinnitus, trouble swallowing and voice change.    Eyes: Negative for photophobia, pain, discharge, redness, itching and visual disturbance.   Respiratory: Negative for apnea, cough, choking, chest tightness, shortness of breath, wheezing and stridor.    Cardiovascular: Negative for chest pain, palpitations and leg swelling.   Gastrointestinal: Negative for abdominal distention, abdominal pain, anal bleeding, blood in stool, constipation, diarrhea, nausea, rectal pain and vomiting.   Endocrine: Negative for cold intolerance, heat  "intolerance, polydipsia, polyphagia and polyuria.   Genitourinary: Negative for decreased urine volume, difficulty urinating, dysuria, enuresis, flank pain, frequency, genital sores, hematuria and urgency.   Musculoskeletal: Negative for arthralgias, back pain, gait problem, joint swelling, myalgias, neck pain and neck stiffness.   Skin: Negative for color change, pallor, rash and wound.   Allergic/Immunologic: Negative for environmental allergies, food allergies and immunocompromised state.   Neurological: Negative for dizziness, tremors, seizures, syncope, facial asymmetry, speech difficulty, weakness, light-headedness, numbness and headaches.   Hematological: Negative for adenopathy. Does not bruise/bleed easily.   Psychiatric/Behavioral: Negative for agitation, behavioral problems, confusion, decreased concentration, dysphoric mood, hallucinations, self-injury, sleep disturbance and suicidal ideas. The patient is not nervous/anxious and is not hyperactive.        Objective   Height 157.5 cm (62\"), weight 75.1 kg (165 lb 9.6 oz).  Body mass index is 30.29 kg/m².    Physical Exam  Constitutional:       General: She is not in acute distress.     Appearance: She is well-developed. She is not diaphoretic.   HENT:      Head: Normocephalic and atraumatic.   Pulmonary:      Effort: Pulmonary effort is normal.   Skin:     General: Skin is warm and dry.   Neurological:      Mental Status: She is alert and oriented to person, place, and time.      Cranial Nerves: No cranial nerve deficit.      Comments: Speech production is fluent with no paraphasic errors.  She is alert, pleasant, conversant, and appropriate.         Assessment & Plan     Independent Review of Radiographic Studies:      Available for my review is a carotid ultrasound which was performed earlier today.  A comparison study from 9/30/2022 is also available for my review.  Peak systolic velocities on the right side are 131 cm/s with a ratio of 1.7.  Peak " systolic velocities in the left side are 142 cm/s with a ratio of 1.53.  These values are essentially unchanged in comparison to the study from November.    Medical Decision Making:      She will need a surveillance ultrasound in 1 year, or sooner if clinically indicated.  I reviewed the signs and symptoms of stroke with her.  I directed her to call 911 if she think she is having a stroke.  I will follow-up with her after her next ultrasound.    Diagnoses and all orders for this visit:    1. Left carotid stenosis (Primary)  -     Duplex Carotid Ultrasound CAR        No follow-ups on file.           This document signed by NEYDA Ku MD March 27, 2023 16:33 EDT

## 2023-12-04 RX ORDER — ATORVASTATIN CALCIUM 80 MG/1
TABLET, FILM COATED ORAL
Qty: 60 TABLET | Refills: 0 | Status: SHIPPED | OUTPATIENT
Start: 2023-12-04

## 2024-01-26 RX ORDER — ATORVASTATIN CALCIUM 80 MG/1
TABLET, FILM COATED ORAL
Qty: 60 TABLET | Refills: 0 | Status: SHIPPED | OUTPATIENT
Start: 2024-01-26

## 2024-03-06 ENCOUNTER — OFFICE VISIT (OUTPATIENT)
Dept: NEUROSURGERY | Facility: CLINIC | Age: 64
End: 2024-03-06
Payer: COMMERCIAL

## 2024-03-06 ENCOUNTER — HOSPITAL ENCOUNTER (OUTPATIENT)
Dept: CARDIOLOGY | Facility: HOSPITAL | Age: 64
Discharge: HOME | End: 2024-03-06
Admitting: NEUROLOGICAL SURGERY
Payer: COMMERCIAL

## 2024-03-06 VITALS — BODY MASS INDEX: 28.63 KG/M2 | TEMPERATURE: 96.8 F | WEIGHT: 155.6 LBS | HEIGHT: 62 IN

## 2024-03-06 DIAGNOSIS — I65.22 LEFT CAROTID STENOSIS: Primary | ICD-10-CM

## 2024-03-06 DIAGNOSIS — Z98.890 H/O CAROTID ENDARTERECTOMY: ICD-10-CM

## 2024-03-06 LAB
BH CV XLRA MEAS LEFT DIST CCA EDV: 25.1 CM/SEC
BH CV XLRA MEAS LEFT DIST CCA PSV: 105 CM/SEC
BH CV XLRA MEAS LEFT DIST ICA EDV: 27 CM/SEC
BH CV XLRA MEAS LEFT DIST ICA PSV: 66.3 CM/SEC
BH CV XLRA MEAS LEFT ICA/CCA RATIO: 1.39
BH CV XLRA MEAS LEFT MID CCA EDV: 28.3 CM/SEC
BH CV XLRA MEAS LEFT MID CCA PSV: 92.7 CM/SEC
BH CV XLRA MEAS LEFT MID ICA EDV: 38.3 CM/SEC
BH CV XLRA MEAS LEFT MID ICA PSV: 108 CM/SEC
BH CV XLRA MEAS LEFT PROX CCA EDV: 31.4 CM/SEC
BH CV XLRA MEAS LEFT PROX CCA PSV: 119 CM/SEC
BH CV XLRA MEAS LEFT PROX ECA EDV: 16.5 CM/SEC
BH CV XLRA MEAS LEFT PROX ECA PSV: 109 CM/SEC
BH CV XLRA MEAS LEFT PROX ICA EDV: 42.2 CM/SEC
BH CV XLRA MEAS LEFT PROX ICA PSV: 146 CM/SEC
BH CV XLRA MEAS LEFT PROX SCLA PSV: 120 CM/SEC
BH CV XLRA MEAS LEFT VERTEBRAL A EDV: 13 CM/SEC
BH CV XLRA MEAS LEFT VERTEBRAL A PSV: 53.4 CM/SEC
BH CV XLRA MEAS RIGHT DIST CCA EDV: 22.3 CM/SEC
BH CV XLRA MEAS RIGHT DIST CCA PSV: 82.7 CM/SEC
BH CV XLRA MEAS RIGHT DIST ICA EDV: 31.4 CM/SEC
BH CV XLRA MEAS RIGHT DIST ICA PSV: 101 CM/SEC
BH CV XLRA MEAS RIGHT ICA/CCA RATIO: 2.32
BH CV XLRA MEAS RIGHT MID CCA EDV: 20.5 CM/SEC
BH CV XLRA MEAS RIGHT MID CCA PSV: 79.2 CM/SEC
BH CV XLRA MEAS RIGHT MID ICA EDV: -61.5 CM/SEC
BH CV XLRA MEAS RIGHT MID ICA PSV: -192 CM/SEC
BH CV XLRA MEAS RIGHT PROX CCA EDV: 22 CM/SEC
BH CV XLRA MEAS RIGHT PROX CCA PSV: 107 CM/SEC
BH CV XLRA MEAS RIGHT PROX ECA EDV: 16.4 CM/SEC
BH CV XLRA MEAS RIGHT PROX ECA PSV: 96.2 CM/SEC
BH CV XLRA MEAS RIGHT PROX ICA EDV: 46.2 CM/SEC
BH CV XLRA MEAS RIGHT PROX ICA PSV: 167 CM/SEC
BH CV XLRA MEAS RIGHT PROX SCLA PSV: 220 CM/SEC
BH CV XLRA MEAS RIGHT VERTEBRAL A EDV: 15.8 CM/SEC
BH CV XLRA MEAS RIGHT VERTEBRAL A PSV: 46.3 CM/SEC

## 2024-03-06 PROCEDURE — 99214 OFFICE O/P EST MOD 30 MIN: CPT | Performed by: NEUROLOGICAL SURGERY

## 2024-03-06 PROCEDURE — 93880 EXTRACRANIAL BILAT STUDY: CPT

## 2024-03-06 NOTE — PROGRESS NOTES
"Subjective     Chief Complaint: Follow-up carotid endarterectomy    Patient ID: Joie Garnica is a 64 y.o. female is here today for follow-up.    History of Present Illness    This is a 64-year-old woman in whom I performed a left-sided carotid endarterectomy in 2022.  She presents today to discuss the results of her most recent surveillance carotid ultrasound.  She denies any TIA or strokelike symptoms, specifically asked her about numbness, tingling, weakness, visual loss, or language issues.    The following portions of the patient's history were reviewed and updated as appropriate: allergies, current medications, past family history, past medical history, past social history, past surgical history and problem list.    Family history:   Family History   Problem Relation Age of Onset    No Known Problems Mother     Heart attack Father        Social history:   Social History     Socioeconomic History    Marital status: Single   Tobacco Use    Smoking status: Never    Smokeless tobacco: Never   Vaping Use    Vaping status: Never Used   Substance and Sexual Activity    Alcohol use: Yes     Comment: rarely    Drug use: No    Sexual activity: Defer       Review of Systems    Objective   Temperature 96.8 °F (36 °C), temperature source Temporal, height 157.5 cm (62\"), weight 70.6 kg (155 lb 9.6 oz).  Body mass index is 28.46 kg/m².    Physical Exam    Assessment & Plan     Independent Review of Radiographic Studies:      Available for my review is a carotid ultrasound which was performed earlier today.  Peak systolic velocities on the right side are 192 cm/s, end-diastolic of 61.5 with a ratio of 2.32.  Left-sided velocities are peak systolic of 146, end-diastolic of 42.2 cm/s with a ratio of 1.39.  A comparison study from 3/27/2023 is also available for my review.  There has been modest increase in the velocities in the right side from 123 cm/s.  Left-sided velocities are essentially unchanged.    Medical " Decision Making:      Given the modest increase in the velocities on the right side, my recommendation is for her to follow-up with me in 1 year with a repeat carotid ultrasound.  She does not have much in the way of risk factors given that she is not a smoker, her diabetes is reasonably well-controlled and she is following up with her primary care doctor for management of her hypertension and dyslipidemia.    I once again reviewed the signs and symptoms of stroke with her, and I directed her to call 911 if she think she is having a stroke.    Diagnoses and all orders for this visit:    1. Left carotid stenosis (Primary)  -     Duplex Carotid Ultrasound CAR    2. H/O carotid endarterectomy  -     Duplex Carotid Ultrasound CAR        No follow-ups on file.           This document signed by NEYDA Ku MD March 6, 2024 14:19 EST

## 2024-04-17 RX ORDER — ATORVASTATIN CALCIUM 80 MG/1
TABLET, FILM COATED ORAL
Qty: 60 TABLET | Refills: 0 | OUTPATIENT
Start: 2024-04-17

## 2025-03-05 ENCOUNTER — OFFICE VISIT (OUTPATIENT)
Dept: NEUROSURGERY | Facility: CLINIC | Age: 65
End: 2025-03-05
Payer: COMMERCIAL

## 2025-03-05 ENCOUNTER — HOSPITAL ENCOUNTER (OUTPATIENT)
Dept: CARDIOLOGY | Facility: HOSPITAL | Age: 65
Discharge: HOME | End: 2025-03-05
Admitting: NEUROLOGICAL SURGERY
Payer: COMMERCIAL

## 2025-03-05 VITALS — BODY MASS INDEX: 27.25 KG/M2 | WEIGHT: 148.1 LBS | TEMPERATURE: 98.6 F | HEIGHT: 62 IN

## 2025-03-05 VITALS — HEIGHT: 62 IN | BODY MASS INDEX: 28.64 KG/M2 | WEIGHT: 155.65 LBS

## 2025-03-05 DIAGNOSIS — Z98.890 STATUS POST CAROTID ENDARTERECTOMY: ICD-10-CM

## 2025-03-05 DIAGNOSIS — I65.23 BILATERAL CAROTID ARTERY STENOSIS: Primary | ICD-10-CM

## 2025-03-05 LAB
BH CV XLRA MEAS LEFT DIST CCA EDV: 23.8 CM/SEC
BH CV XLRA MEAS LEFT DIST CCA PSV: 65.8 CM/SEC
BH CV XLRA MEAS LEFT DIST ICA EDV: 34.8 CM/SEC
BH CV XLRA MEAS LEFT DIST ICA PSV: 78.8 CM/SEC
BH CV XLRA MEAS LEFT ICA/CCA RATIO: 1.45
BH CV XLRA MEAS LEFT MID CCA EDV: 21.6 CM/SEC
BH CV XLRA MEAS LEFT MID CCA PSV: 67.1 CM/SEC
BH CV XLRA MEAS LEFT MID ICA EDV: 31.3 CM/SEC
BH CV XLRA MEAS LEFT MID ICA PSV: 76.4 CM/SEC
BH CV XLRA MEAS LEFT PROX CCA EDV: 20.8 CM/SEC
BH CV XLRA MEAS LEFT PROX CCA PSV: 69.7 CM/SEC
BH CV XLRA MEAS LEFT PROX ECA EDV: 14.7 CM/SEC
BH CV XLRA MEAS LEFT PROX ECA PSV: 78.3 CM/SEC
BH CV XLRA MEAS LEFT PROX ICA EDV: 27 CM/SEC
BH CV XLRA MEAS LEFT PROX ICA PSV: 100.8 CM/SEC
BH CV XLRA MEAS LEFT PROX SCLA PSV: 106.5 CM/SEC
BH CV XLRA MEAS LEFT VERTEBRAL A EDV: 15.7 CM/SEC
BH CV XLRA MEAS LEFT VERTEBRAL A PSV: 40.6 CM/SEC
BH CV XLRA MEAS RIGHT DIST CCA EDV: 12.3 CM/SEC
BH CV XLRA MEAS RIGHT DIST CCA PSV: 53.2 CM/SEC
BH CV XLRA MEAS RIGHT DIST ICA EDV: 30.4 CM/SEC
BH CV XLRA MEAS RIGHT DIST ICA PSV: 93.7 CM/SEC
BH CV XLRA MEAS RIGHT ICA/CCA RATIO: 2.29
BH CV XLRA MEAS RIGHT MID CCA EDV: 18.8 CM/SEC
BH CV XLRA MEAS RIGHT MID CCA PSV: 61.7 CM/SEC
BH CV XLRA MEAS RIGHT MID ICA EDV: 42 CM/SEC
BH CV XLRA MEAS RIGHT MID ICA PSV: 141 CM/SEC
BH CV XLRA MEAS RIGHT PROX CCA EDV: 13.3 CM/SEC
BH CV XLRA MEAS RIGHT PROX CCA PSV: 51.3 CM/SEC
BH CV XLRA MEAS RIGHT PROX ECA EDV: 10.1 CM/SEC
BH CV XLRA MEAS RIGHT PROX ECA PSV: 52.3 CM/SEC
BH CV XLRA MEAS RIGHT PROX ICA EDV: 40.2 CM/SEC
BH CV XLRA MEAS RIGHT PROX ICA PSV: 97.3 CM/SEC
BH CV XLRA MEAS RIGHT PROX SCLA PSV: 80.4 CM/SEC
BH CV XLRA MEAS RIGHT VERTEBRAL A EDV: 18.2 CM/SEC
BH CV XLRA MEAS RIGHT VERTEBRAL A PSV: 44.1 CM/SEC
LEFT ARM BP: NORMAL MMHG
RIGHT ARM BP: NORMAL MMHG

## 2025-03-05 PROCEDURE — 99213 OFFICE O/P EST LOW 20 MIN: CPT | Performed by: NEUROLOGICAL SURGERY

## 2025-03-05 PROCEDURE — 93880 EXTRACRANIAL BILAT STUDY: CPT

## 2025-03-05 RX ORDER — LOSARTAN POTASSIUM AND HYDROCHLOROTHIAZIDE 12.5; 1 MG/1; MG/1
1 TABLET ORAL DAILY
COMMUNITY
Start: 2024-11-18

## 2025-03-05 RX ORDER — CYANOCOBALAMIN/FOLIC ACID 1MG-400MCG
TABLET, SUBLINGUAL SUBLINGUAL
COMMUNITY

## 2025-03-05 RX ORDER — POTASSIUM CHLORIDE 750 MG/1
1 CAPSULE, EXTENDED RELEASE ORAL DAILY
COMMUNITY
Start: 2025-02-25

## 2025-03-05 RX ORDER — AMLODIPINE BESYLATE 5 MG/1
TABLET ORAL
COMMUNITY
Start: 2025-03-03

## 2025-03-05 RX ORDER — CARVEDILOL 6.25 MG/1
1 TABLET ORAL EVERY 12 HOURS SCHEDULED
COMMUNITY
Start: 2025-01-19

## 2025-03-05 RX ORDER — ISOSORBIDE MONONITRATE 30 MG/1
TABLET, EXTENDED RELEASE ORAL
COMMUNITY

## 2025-03-05 RX ORDER — BUPROPION HYDROCHLORIDE 150 MG/1
150 TABLET ORAL EVERY MORNING
COMMUNITY
Start: 2025-01-23

## 2025-03-05 RX ORDER — ASPIRIN 81 MG/1
TABLET, CHEWABLE ORAL
COMMUNITY

## 2025-03-05 RX ORDER — FLUOXETINE HYDROCHLORIDE 60 MG/1
TABLET, FILM COATED ORAL; ORAL
COMMUNITY

## 2025-03-05 NOTE — PROGRESS NOTES
Subjective     Chief Complaint: Carotid stenosis follow-up    Patient ID: Joie Garnica is a 65 y.o. female is here today for follow-up.    History of Present Illness    This is a 65-year-old woman in whom I performed a left sided carotid endarterectomy in September 2022.  She presents today to discuss the results of her most recent surveillance carotid ultrasound.    Since she was last seen in my clinic last year, she denies any new neurological symptoms.  Specifically, I asked her about numbness, tingling, weakness, strokelike symptoms, amaurosis fugax, or seizures.    The following portions of the patient's history were reviewed and updated as appropriate: allergies, current medications, past family history, past medical history, past social history, past surgical history and problem list.    Family history:   Family History   Problem Relation Age of Onset    No Known Problems Mother     Heart attack Father        Social history:   Social History     Socioeconomic History    Marital status: Single   Tobacco Use    Smoking status: Never     Passive exposure: Never    Smokeless tobacco: Never   Vaping Use    Vaping status: Never Used   Substance and Sexual Activity    Alcohol use: Yes     Comment: rarely    Drug use: No    Sexual activity: Defer       Review of Systems   Constitutional:  Negative for activity change, appetite change, chills, diaphoresis, fatigue, fever and unexpected weight change.   HENT:  Negative for congestion, dental problem, drooling, ear discharge, ear pain, facial swelling, hearing loss, mouth sores, nosebleeds, postnasal drip, rhinorrhea, sinus pressure, sinus pain, sneezing, sore throat, tinnitus, trouble swallowing and voice change.    Eyes:  Negative for photophobia, pain, discharge, redness, itching and visual disturbance.   Respiratory:  Negative for apnea, cough, choking, chest tightness, shortness of breath, wheezing and stridor.    Cardiovascular:  Negative for chest  "pain, palpitations and leg swelling.   Gastrointestinal:  Negative for abdominal distention, abdominal pain, anal bleeding, blood in stool, constipation, diarrhea, nausea, rectal pain and vomiting.   Endocrine: Negative for cold intolerance, heat intolerance, polydipsia, polyphagia and polyuria.   Genitourinary:  Negative for decreased urine volume, difficulty urinating, dysuria, enuresis, flank pain, frequency, genital sores, hematuria and urgency.   Musculoskeletal:  Negative for arthralgias, back pain, gait problem, joint swelling, myalgias, neck pain and neck stiffness.   Skin:  Negative for color change, pallor, rash and wound.   Allergic/Immunologic: Negative for environmental allergies, food allergies and immunocompromised state.   Neurological:  Negative for dizziness, tremors, seizures, syncope, facial asymmetry, speech difficulty, weakness, light-headedness, numbness and headaches.   Hematological:  Negative for adenopathy. Does not bruise/bleed easily.   Psychiatric/Behavioral:  Negative for agitation, behavioral problems, confusion, decreased concentration, dysphoric mood, hallucinations, self-injury, sleep disturbance and suicidal ideas. The patient is not nervous/anxious and is not hyperactive.        Objective   Temperature 98.6 °F (37 °C), temperature source Temporal, height 157.5 cm (62\"), weight 67.2 kg (148 lb 1.6 oz).  Body mass index is 27.09 kg/m².    Physical Exam  Constitutional:       General: She is not in acute distress.     Appearance: She is well-developed. She is not diaphoretic.   HENT:      Head: Normocephalic and atraumatic.   Pulmonary:      Effort: Pulmonary effort is normal.   Skin:     General: Skin is warm and dry.   Neurological:      Mental Status: She is alert and oriented to person, place, and time.      Cranial Nerves: No cranial nerve deficit.      Comments: She is alert, pleasant, conversant, and appropriate.  Speech production is fluent with no paraphasic errors.  Casual " gait is unremarkable.         Assessment & Plan     Independent Review of Radiographic Studies:      Available for my review is a carotid ultrasound which was performed earlier today.  A comparison study from 3/6/2024 is also available for my review.      Peak systolic velocities on the right side are 141 cm/s with a end-diastolic of 42 for a ratio of 2.29, and peak systolic velocities on the left side are 100 cm/s with an end-diastolic of 27 and a ratio of 1.45.    In 2024, peak systolic velocities in the right side were 192 cm/s with an end-diastolic of 61 for a ratio of 2.3.  Peak systolic velocities on the left side are 146 cm/s with an end-diastolic of 42 and a ratio of 1.39.    Medical Decision Making:      This is a 65-year-old woman with bilateral carotid stenosis, who is about 3 years out following an uncomplicated left sided carotid endarterectomy.  She has demonstrated stability of her carotid arteries over the last 3 years, and as such there is no need for continued neurosurgical monitoring.  I carefully reviewed the signs and symptoms of stroke with her.  I directed her to call an ambulance if she think she is having a stroke and/or TIA.  I do recommend that she continues to have annual carotid ultrasounds for surveillance, but she can follow-up with her primary care doctor to discuss the results of the studies.  I would be happy to see her back on an as-needed basis for any new or worsening symptoms, or if she has questions/concerns.    Diagnoses and all orders for this visit:    1. Bilateral carotid artery stenosis (Primary)    2. Status post carotid endarterectomy        No follow-ups on file.           This document signed by NEYDA Ku MD March 5, 2025 15:48 EST

## 2025-07-31 ENCOUNTER — OFFICE VISIT (OUTPATIENT)
Dept: CARDIOLOGY | Facility: CLINIC | Age: 65
End: 2025-07-31
Payer: COMMERCIAL

## 2025-07-31 VITALS
WEIGHT: 137.4 LBS | OXYGEN SATURATION: 97 % | BODY MASS INDEX: 25.28 KG/M2 | SYSTOLIC BLOOD PRESSURE: 118 MMHG | HEIGHT: 62 IN | DIASTOLIC BLOOD PRESSURE: 52 MMHG

## 2025-07-31 DIAGNOSIS — I10 PRIMARY HYPERTENSION: ICD-10-CM

## 2025-07-31 DIAGNOSIS — E11.9 TYPE 2 DIABETES MELLITUS WITHOUT COMPLICATION, WITHOUT LONG-TERM CURRENT USE OF INSULIN: ICD-10-CM

## 2025-07-31 DIAGNOSIS — R06.09 DOE (DYSPNEA ON EXERTION): ICD-10-CM

## 2025-07-31 DIAGNOSIS — E78.2 MIXED HYPERLIPIDEMIA: ICD-10-CM

## 2025-07-31 DIAGNOSIS — R07.2 PRECORDIAL CHEST PAIN: Primary | ICD-10-CM

## 2025-07-31 DIAGNOSIS — I25.118 CORONARY ARTERY DISEASE OF NATIVE ARTERY OF NATIVE HEART WITH STABLE ANGINA PECTORIS: ICD-10-CM

## 2025-07-31 DIAGNOSIS — I65.23 BILATERAL CAROTID ARTERY STENOSIS: ICD-10-CM

## 2025-07-31 DIAGNOSIS — G45.1 TIA INVOLVING LEFT INTERNAL CAROTID ARTERY: ICD-10-CM

## 2025-07-31 DIAGNOSIS — E78.2 MIXED HYPERLIPIDEMIA: Primary | ICD-10-CM

## 2025-07-31 PROCEDURE — 3074F SYST BP LT 130 MM HG: CPT | Performed by: INTERNAL MEDICINE

## 2025-07-31 PROCEDURE — 3078F DIAST BP <80 MM HG: CPT | Performed by: INTERNAL MEDICINE

## 2025-07-31 RX ORDER — CLOPIDOGREL BISULFATE 75 MG/1
75 TABLET ORAL DAILY
COMMUNITY

## 2025-07-31 RX ORDER — BLOOD SUGAR DIAGNOSTIC
1 STRIP MISCELLANEOUS DAILY
COMMUNITY
Start: 2025-04-24

## 2025-07-31 RX ORDER — BLOOD-GLUCOSE METER
EACH MISCELLANEOUS
COMMUNITY
Start: 2025-04-24

## 2025-07-31 RX ORDER — LANCETS 33 GAUGE
1 EACH MISCELLANEOUS DAILY
COMMUNITY
Start: 2025-04-24

## 2025-07-31 RX ORDER — RANOLAZINE 500 MG/1
1 TABLET, EXTENDED RELEASE ORAL EVERY 12 HOURS SCHEDULED
COMMUNITY
Start: 2025-07-11

## 2025-07-31 RX ORDER — LOSARTAN POTASSIUM AND HYDROCHLOROTHIAZIDE 12.5; 1 MG/1; MG/1
0.5 TABLET ORAL DAILY
Status: SHIPPED | COMMUNITY
Start: 2025-07-31

## 2025-07-31 RX ORDER — NITROGLYCERIN 400 UG/1
1 SPRAY ORAL
COMMUNITY

## 2025-07-31 RX ORDER — PREDNISONE 20 MG/1
20 TABLET ORAL DAILY
COMMUNITY
End: 2025-07-31

## 2025-07-31 NOTE — PROGRESS NOTES
OFFICE VISIT  NOTE  [facility] CARDIOLOGY      Name: Joie Garnica    Date: 2025  MRN:  [id number]  :  1960      REFERRING/PRIMARY PROVIDER:  Andie Chambers APRN    Chief Complaint   Patient presents with    Hypertension       HPI: Joie Garnica is a 65 y.o. female who presents for CAD and dizziness.  Admitted to [facility] 2025 for chest pain, underwent cardiac catheterization was told by Dr. Liu that she had minimal disease that would be treated by medications when she went for follow-up she saw Dr. Moreno who told her that she had a blockage at the bifurcation that would be best served by CABG.  She was confused by the differing opinions and therefore came to see me for third opinion.  She reports occasional chest pain, minimal, can be at rest or exertion, sharp in nature, lasting a few minutes.  After hospitalization the increased Imdur from 30 to 60 mg added Ranexa 500 mg twice daily and clopidogrel 75 mg daily and continued aspirin and atorvastatin.  Dizziness is her main complaint, she had 1 syncopal episode while at home she stood from a car in hot weather walked inside and was very dizzy and passed out did not go to the hospital at that time.  Has not worn a Holter monitor yet.  She gets dizzy when she stands up but also has symptoms of dizziness when seated.    Past Medical History:   Diagnosis Date    Abnormal ECG     Anxiety     Chronic kidney disease      -     Diabetes mellitus     GERD (gastroesophageal reflux disease)     Hypertension     Stroke        Past Surgical History:   Procedure Laterality Date    APPENDECTOMY      CARDIAC CATHETERIZATION  25    CAROTID ENDARTERECTOMY Left 2022    Procedure: CAROTID ENDARTERECTOMY WITH EEG LEFT;  Surgeon: Markel Ku MD;  Location: [facility];  Service: Neurosurgery;  Laterality: Left;    COLONOSCOPY      INTERVENTIONAL RADIOLOGY PROCEDURE Bilateral  2022    Procedure: CAROTID CEREBRAL ANGIOGRAM BILATERAL;  Surgeon: Rolando Ralph MD;  Location: [facility] INVASIVE LOCATION;  Service: Interventional Radiology;  Laterality: Bilateral;       Social History     Socioeconomic History    Marital status: Single   Tobacco Use    Smoking status: Never     Passive exposure: Never    Smokeless tobacco: Never   Vaping Use    Vaping status: Never Used   Substance and Sexual Activity    Alcohol use: Not Currently     Comment: rarely    Drug use: Never    Sexual activity: Yes     Partners: Male     Birth control/protection: Post-menopausal       Family History   Problem Relation Age of Onset    No Known Problems Mother     Heart attack Father         Triple bypass,  from heart attack        ROS:   Constitutional no fever,  no weight loss   Skin no rash, no subcutaneous nodules   Otolaryngeal no difficulty swallowing   Cardiovascular See HPI   Pulmonary no cough, no sputum production   Gastrointestinal no constipation, no diarrhea   Genitourinary no dysuria, no hematuria   Hematologic no easy bruisability, no abnormal bleeding   Musculoskeletal no muscle pain   Neurologic no dizziness, no falls         No Known Allergies      Current Outpatient Medications:     aspirin (Aspirin 81) 81 MG chewable tablet, , Disp: , Rfl:     atorvastatin (LIPITOR) 80 MG tablet, Take 1 tablet by mouth once daily, Disp: 60 tablet, Rfl: 0    Blood Glucose Monitoring Suppl (OneTouch Verio Flex System) w/Device kit, CHECK BLOOD SUGAR ONCE DAILY AS INSTRUCTED, Disp: , Rfl:     buPROPion XL (WELLBUTRIN XL) 150 MG 24 hr tablet, Take 1 tablet by mouth Every Morning. (Patient taking differently: Take 2 tablets by mouth Every Morning.), Disp: , Rfl:     carvedilol (COREG) 6.25 MG tablet, Take 1 tablet by mouth Every 12 (Twelve) Hours., Disp: , Rfl:     clopidogrel (PLAVIX) 75 MG tablet, Take 1 tablet by mouth Daily., Disp: , Rfl:     Cobalamin Combinations (B-12) 1000-400 MCG sublingual  "tablet, , Disp: , Rfl:     glipizide (GLUCOTROL) 5 MG tablet, Take 0.5 tablets by mouth Daily., Disp: , Rfl:     isosorbide mononitrate (IMDUR) 30 MG 24 hr tablet, Take 2 tablets by mouth Daily., Disp: , Rfl:     Lancets (OneTouch Delica Plus [id number]) misc, 1 each by Other route Daily., Disp: , Rfl:     losartan-hydrochlorothiazide (HYZAAR) 100-12.5 MG per tablet, Take 0.5 tablets by mouth Daily., Disp: , Rfl:     nitroglycerin (NITROLINGUAL) 0.4 MG/SPRAY spray, Place 1 spray under the tongue Every 5 (Five) Minutes As Needed for Chest Pain., Disp: , Rfl:     omeprazole (priLOSEC) 20 MG capsule, Take 1 capsule by mouth Daily., Disp: , Rfl:     OneTouch Verio test strip, 1 each Daily. Check blood sugar, Disp: , Rfl:     oxybutynin (DITROPAN) 5 MG tablet, Take 1 tablet by mouth 2 (Two) Times a Day., Disp: , Rfl:     potassium chloride (MICRO-K) 10 MEQ CR capsule, Take 1 capsule by mouth Daily., Disp: , Rfl:     ranolazine (RANEXA) 500 MG 12 hr tablet, Take 1 tablet by mouth Every 12 (Twelve) Hours., Disp: , Rfl:     Vitals:    07/31/25 1336   BP: 118/52   BP Location: Left arm   Patient Position: Sitting   Cuff Size: Adult   SpO2: 97%   Weight: 62.3 kg (137 lb 6.4 oz)   Height: 157.5 cm (62.01\")     Body mass index is 25.12 kg/m².    PHYSICAL EXAM:    General Appearance:   well developed  well nourished  HENT:   oropharynx moist  lips not cyanotic  Neck:  thyroid not enlarged  supple  Respiratory:  no respiratory distress  normal breath sounds  no rales  Cardiovascular:  no jugular venous distention  regular rhythm  apical impulse normal  S1 normal, S2 normal  no S3, no S4   no murmur  no rub, no thrill  carotid pulses normal; no bruit  lower extremity edema: none      Musculoskeletal:  no clubbing of fingers.   normocephalic, head atraumatic  Skin:   warm, dry  Psychiatric:  judgement and insight appropriate  normal mood and affect    RESULTS:   Procedures    Results for orders placed during the hospital encounter " "of 08/23/22    Adult Transthoracic Echo Complete w/ Color, Spectral and Contrast if Necessary Per Protocol 08/24/2022  6:06 PM    Interpretation Summary  · Left ventricular ejection fraction appears to be 61 - 65%. Left ventricular systolic function is normal.  · Left ventricular wall thickness is consistent with mild concentric hypertrophy.  · Saline test results are negative.        Labs:  Lab Results   Component Value Date    CHOL 191 08/24/2022    TRIG 300 (H) 08/24/2022    HDL 38 (L) 08/24/2022     (H) 08/24/2022    AST 25 08/25/2022    ALT 16 08/25/2022     Lab Results   Component Value Date    HGBA1C 6.60 (H) 09/12/2022     No components found for: \"CREATINININE\"  No results found for: \"EGFRIFNONA\"    Most recent PCP note, imaging tests, and labs reviewed.    ASSESSMENT:  Problem List Items Addressed This Visit       Bilateral symptomatic carotid stenosis left greater than right    Relevant Medications    nitroglycerin (NITROLINGUAL) 0.4 MG/SPRAY spray    clopidogrel (PLAVIX) 75 MG tablet    losartan-hydrochlorothiazide (HYZAAR) 100-12.5 MG per tablet    T2DM (type 2 diabetes mellitus)    Relevant Medications    losartan-hydrochlorothiazide (HYZAAR) 100-12.5 MG per tablet    Primary hypertension    Relevant Medications    nitroglycerin (NITROLINGUAL) 0.4 MG/SPRAY spray    losartan-hydrochlorothiazide (HYZAAR) 100-12.5 MG per tablet    Mixed hyperlipidemia    Relevant Medications    clopidogrel (PLAVIX) 75 MG tablet     Other Visit Diagnoses         Precordial chest pain    -  Primary    Relevant Medications    nitroglycerin (NITROLINGUAL) 0.4 MG/SPRAY spray    clopidogrel (PLAVIX) 75 MG tablet    ranolazine (RANEXA) 500 MG 12 hr tablet    losartan-hydrochlorothiazide (HYZAAR) 100-12.5 MG per tablet      [place] (dyspnea on exertion)        Relevant Medications    nitroglycerin (NITROLINGUAL) 0.4 MG/SPRAY spray    clopidogrel (PLAVIX) 75 MG tablet    ranolazine (RANEXA) 500 MG 12 hr tablet    " losartan-hydrochlorothiazide (HYZAAR) 100-12.5 MG per tablet      Coronary artery disease of native artery of native heart with stable angina pectoris        Relevant Medications    nitroglycerin (NITROLINGUAL) 0.4 MG/SPRAY spray    clopidogrel (PLAVIX) 75 MG tablet    ranolazine (RANEXA) 500 MG 12 hr tablet    losartan-hydrochlorothiazide (HYZAAR) 100-12.5 MG per tablet            PLAN:    1.  CAD:  Difficult decision making as I do not have her films, I will request cardiac cath films from [facility], once I review them I will discuss with the patient regarding my recommendations on revascularization.  Will obtain echo report and echo images as well.  Continue aspirin, clopidogrel, Imdur, Ranexa, and atorvastatin  The patient was advised to call 911 if chest pain worsens or persists despite rest or nitroglycerin if available, and to avoid strenuous activity until further notice.  EKG in the office 7/31/2025 shows normal sinus rhythm no ischemic changes no evidence of infarct.  2.  Orthostatic hypotension:  Decrease losartan/HCTZ by half  May consider stopping HCTZ completely if this helps  Would also consider decreasing or stopping glipizide as she may be having hypoglycemic episode  Also consider stopping oxybutynin.  Her daughter is a nurse practitioner and had several good suggestions.    3.  Hypertension:  Goal blood pressure less than 130/80  Lenient goals given orthostatic hypotension  Well-controlled currently  Decreasing losartan/ACTZ due to orthostatic hypotension    4.  Mixed hyperlipidemia:  Goal LDL less than 70  Continue high-dose atorvastatin, check labs.      Advance Care Planning   ACP discussion was held with the patient during this visit. Patient has an advance directive (not in EMR), copy requested.         Return to clinic in 3 months, or sooner as needed.    Thank you for the opportunity to share in the care of your patient; please do not hesitate to call me with any questions.      Gordon Jones MD, [facility], [facility]  Office: [phone] [address] [place] [address]    07/31/25

## 2025-08-12 ENCOUNTER — HOSPITAL ENCOUNTER (OUTPATIENT)
Dept: CARDIOLOGY | Facility: HOSPITAL | Age: 65
Discharge: HOME | End: 2025-08-12
Payer: COMMERCIAL

## 2025-08-12 ENCOUNTER — TELEPHONE (OUTPATIENT)
Dept: CARDIOLOGY | Facility: CLINIC | Age: 65
End: 2025-08-12
Payer: COMMERCIAL

## 2025-08-12 DIAGNOSIS — Z00.6 ENCOUNTER FOR EXAMINATION FOR NORMAL COMPARISON OR CONTROL IN CLINICAL RESEARCH PROGRAM: ICD-10-CM

## 2025-08-14 DIAGNOSIS — R07.2 PRECORDIAL CHEST PAIN: Primary | ICD-10-CM

## 2025-08-19 ENCOUNTER — HOSPITAL ENCOUNTER (OUTPATIENT)
Dept: CARDIOLOGY | Facility: HOSPITAL | Age: 65
Discharge: HOME | End: 2025-08-19
Payer: COMMERCIAL

## (undated) DEVICE — RADIFOCUS GLIDEWIRE: Brand: GLIDEWIRE

## (undated) DEVICE — ADHS SKIN PREMIERPRO EXOFIN TOPICAL HI/VISC .5ML

## (undated) DEVICE — SUT ETHLN 3/0 FS1 30IN 669H

## (undated) DEVICE — RETR STAY HK ELAS SHRP 5MM 50PK

## (undated) DEVICE — CATHETER,URETHRAL,REDRUBBER,STERILE,20FR: Brand: MEDLINE

## (undated) DEVICE — DISPOSABLE BIPOLAR FORCEPS 7 3/4" (19.7CM) SCOVILLE BAYONET, INSULATED, 1.5MM TIP AND 12 FT. (3.6M) CABLE: Brand: KIRWAN

## (undated) DEVICE — Device: Brand: JELCO

## (undated) DEVICE — 3M™ STERI-DRAPE™ INSTRUMENT POUCH 1018: Brand: STERI-DRAPE™

## (undated) DEVICE — TB SXN FRAZIER 12F STRL

## (undated) DEVICE — ELECTRD BLD EDGE/INSUL1P SFTY SLV 2.75IN

## (undated) DEVICE — GLV SURG BIOGEL LTX PF 8

## (undated) DEVICE — KITTNER SPONGE: Brand: DEROYAL

## (undated) DEVICE — JACKSON-PRATT 100CC BULB RESERVOIR: Brand: CARDINAL HEALTH

## (undated) DEVICE — SUT PROLN 6/0 BV1 D/A 30IN 8709H

## (undated) DEVICE — CVR PROB ULTRASND/TRANSD W/GEL 7X11IN STRL

## (undated) DEVICE — KT VLV HEMO MAP ACC PLS LG/BORE MTL/INTRO W/TORQ/DEV

## (undated) DEVICE — SUT VICYL 2/0 CR8 18IN DYED J726D

## (undated) DEVICE — GLIDESHEATH SLENDER STAINLESS STEEL KIT: Brand: GLIDESHEATH SLENDER

## (undated) DEVICE — SHNT BYPASS JAVID CAROTID TEMP TYP1852 17FTO10F 27.5CM

## (undated) DEVICE — PK NEURO DISC 10

## (undated) DEVICE — 1 ML TUBERCULIN SYRINGE REGULAR TIP: Brand: MONOJECT

## (undated) DEVICE — GLV SURG PREMIERPRO MIC LTX PF SZ7 BRN

## (undated) DEVICE — ANTIBACTERIAL UNDYED BRAIDED (POLYGLACTIN 910), SYNTHETIC ABSORBABLE SUTURE: Brand: COATED VICRYL

## (undated) DEVICE — SUT SILK 2/0 TIES 18IN A185H

## (undated) DEVICE — SUT PROLN 7/0 BV1 D/A 24IN 8702H

## (undated) DEVICE — JP PERF DRN SIL FLT 7MM FULL: Brand: CARDINAL HEALTH

## (undated) DEVICE — GLV SURG PREMIERPRO MIC LTX PF SZ6.5 BRN

## (undated) DEVICE — RADIFOCUS TORQUE DEVICE MULTI-TORQUE VISE: Brand: RADIFOCUS TORQUE DEVICE

## (undated) DEVICE — NEURO SPONGES: Brand: DEROYAL

## (undated) DEVICE — DRSNG WND GZ PAD BORDERED 4X8IN STRL

## (undated) DEVICE — UNDERGLV SURG BIOGEL INDICAT PI SZ8.5 BLU

## (undated) DEVICE — SYR CONTRL LUERLOK 10CC

## (undated) DEVICE — CATH ANGIO SIM2 HNB5.0 .038 100 P NS

## (undated) DEVICE — FOGARTY - HYDRAGRIP SURGICAL - CLAMP INSERTS: Brand: FOGARTY SOFTJAW

## (undated) DEVICE — PROVIDES A STERILE INTERFACE BETWEEN THE OPERATING ROOM SURGICAL LAMPS (NON-STERILE) AND THE SURGEON OR NURSE (STERILE).: Brand: STERION®CLAMP COVER FABRIC

## (undated) DEVICE — TB SXN FRAZIER 8F STRL

## (undated) DEVICE — INTENDED TO BE USED TO OCCLUDE, RETRACT AND IDENTIFY ARTERIES, VEINS, TENDONS AND NERVES IN SURGICAL PROCEDURES: Brand: STERION®  VESSEL LOOP

## (undated) DEVICE — Device

## (undated) DEVICE — APPL CHLORAPREP TINTED 26ML TEAL

## (undated) DEVICE — NDL HYPO ECLPS SFTY 25G 1 1/2IN

## (undated) DEVICE — DEV COMP RAD PRELUDESYNC 24CM

## (undated) DEVICE — LEX NEURO ANGIOGRAPHY: Brand: MEDLINE INDUSTRIES, INC.